# Patient Record
Sex: MALE | Race: ASIAN | NOT HISPANIC OR LATINO
[De-identification: names, ages, dates, MRNs, and addresses within clinical notes are randomized per-mention and may not be internally consistent; named-entity substitution may affect disease eponyms.]

---

## 2017-04-17 ENCOUNTER — RX RENEWAL (OUTPATIENT)
Age: 72
End: 2017-04-17

## 2018-02-02 ENCOUNTER — APPOINTMENT (OUTPATIENT)
Dept: UROLOGY | Facility: CLINIC | Age: 73
End: 2018-02-02
Payer: MEDICARE

## 2018-02-02 VITALS
HEART RATE: 63 BPM | OXYGEN SATURATION: 98 % | SYSTOLIC BLOOD PRESSURE: 137 MMHG | RESPIRATION RATE: 18 BRPM | TEMPERATURE: 97.4 F | WEIGHT: 134 LBS | BODY MASS INDEX: 20.37 KG/M2 | DIASTOLIC BLOOD PRESSURE: 76 MMHG

## 2018-02-02 PROCEDURE — 99214 OFFICE O/P EST MOD 30 MIN: CPT

## 2018-02-03 LAB
ANION GAP SERPL CALC-SCNC: 12 MMOL/L
BUN SERPL-MCNC: 15 MG/DL
CALCIUM SERPL-MCNC: 9.8 MG/DL
CHLORIDE SERPL-SCNC: 104 MMOL/L
CO2 SERPL-SCNC: 27 MMOL/L
CREAT SERPL-MCNC: 0.88 MG/DL
GLUCOSE SERPL-MCNC: 127 MG/DL
POTASSIUM SERPL-SCNC: 4.7 MMOL/L
PSA FREE FLD-MCNC: 26.4
PSA FREE SERPL-MCNC: 0.78 NG/ML
PSA SERPL-MCNC: 2.95 NG/ML
SODIUM SERPL-SCNC: 143 MMOL/L

## 2019-02-05 ENCOUNTER — APPOINTMENT (OUTPATIENT)
Dept: UROLOGY | Facility: CLINIC | Age: 74
End: 2019-02-05
Payer: MEDICARE

## 2019-02-05 VITALS
BODY MASS INDEX: 20.53 KG/M2 | OXYGEN SATURATION: 96 % | RESPIRATION RATE: 17 BRPM | TEMPERATURE: 97.9 F | WEIGHT: 135 LBS | SYSTOLIC BLOOD PRESSURE: 135 MMHG | HEART RATE: 67 BPM | DIASTOLIC BLOOD PRESSURE: 70 MMHG

## 2019-02-05 PROCEDURE — 99214 OFFICE O/P EST MOD 30 MIN: CPT

## 2019-02-05 NOTE — ADDENDUM
[FreeTextEntry1] : Entered by Loren Roth, acting as scribe for Dr. Vaibhav Mesa.\par \par The documentation recorded by the scribe accurately reflects the service I personally performed and the decisions made by me.

## 2019-02-05 NOTE — LETTER BODY
[FreeTextEntry1] : Norbert Garcia M.D.\par [address] [place] [address]\[place] [address]\par [phone] \par [phone]\par \par Dear Dr. Garcia,\par \par Reason for visit: Elevated PSA. BPH. \par \par This is a 73 year-old gentleman with BPH and elevated PSA status post previous negative prostate biopsy in 2013. His PSA in 2016 was 2.83. Patient returns for follow up. Since his previous visit, the patient continues to take Flomax BID and Proscar regularly with no side effects or difficulties. He reports of nocturia up to 3x per night. The patient previously experienced incomplete voiding. Patient denies any pain or discomfort. Patient denies any changes in health. He denies any hematuria or dysuria. The past medical history, family history and social history are unchanged. All other review of systems are negative. Patient denies any changes in medications. Medication list was reconciled.\par \par On examination, the patient is a elderly-appearing gentleman in no acute distress. He is alert and oriented follows commands. He has normal mood and affect. He is normocephalic. Oral no thrush. Neck is supple. Respirations are unlabored. His abdomen is soft and nontender. Liver is nonpalpable. Bladder is nonpalpable. No CVA tenderness. Neurologically he is grossly intact. No peripheral edema. Skin without gross abnormality. \par \par His PSA in 2016 was 2.95 on Proscar which is improved. His PSA was previously 8.43.\par \par Assessment: Elevated PSA, stable on Proscar. BPH. \par \par I counseled the patient. In terms of his BPH, the patient reports lower urinary tract symptoms. He declines surgical intervention. I renewed his prescription for Proscar and Flomax BID today. I encouraged the patient continue medication as directed. In terms of his elevated PSA, his recent PSA is stable. I recommend the patient repeat PSA today and obtain BMP to further evaluate. Risks and alternatives were discussed. I answered the patient's questions. The patient will follow up as directed and will contact me with any questions or concerns. Thank you for the opportunity to participate in the care of Mr. HALL. I will keep you updated on his progress. \par \par Plan: Repeat PSA. BMP. Continue Proscar and Flomax BID. Follow up in one year. \par

## 2019-02-06 LAB
ANION GAP SERPL CALC-SCNC: 11 MMOL/L
BUN SERPL-MCNC: 24 MG/DL
CALCIUM SERPL-MCNC: 9.6 MG/DL
CHLORIDE SERPL-SCNC: 104 MMOL/L
CO2 SERPL-SCNC: 26 MMOL/L
CREAT SERPL-MCNC: 0.92 MG/DL
GLUCOSE SERPL-MCNC: 108 MG/DL
POTASSIUM SERPL-SCNC: 4.5 MMOL/L
SODIUM SERPL-SCNC: 141 MMOL/L

## 2019-02-09 LAB
PSA FREE FLD-MCNC: 26 %
PSA FREE SERPL-MCNC: 0.7 NG/ML
PSA SERPL-MCNC: 2.75 NG/ML

## 2020-02-04 ENCOUNTER — APPOINTMENT (OUTPATIENT)
Dept: UROLOGY | Facility: CLINIC | Age: 75
End: 2020-02-04
Payer: MEDICARE

## 2020-02-04 VITALS
HEART RATE: 69 BPM | WEIGHT: 137 LBS | OXYGEN SATURATION: 96 % | TEMPERATURE: 97.3 F | BODY MASS INDEX: 20.83 KG/M2 | SYSTOLIC BLOOD PRESSURE: 156 MMHG | DIASTOLIC BLOOD PRESSURE: 77 MMHG | RESPIRATION RATE: 18 BRPM

## 2020-02-04 PROCEDURE — 99214 OFFICE O/P EST MOD 30 MIN: CPT

## 2020-02-04 NOTE — LETTER BODY
[FreeTextEntry1] : Norbert Garcia M.D.\par [address] [place] [address]\[place] [address]\par [phone] \par [phone]\par \par Dear Dr. Garcia,\par \par Reason for visit: Elevated PSA. BPH. \par \par This is a 74 year-old gentleman with BPH and elevated PSA status post previous negative prostate biopsy in 2013. Patient returns today for follow up. His most recent PSA was 2.75. Since his previous visit, the patient reports he is doing well. He continues to take Flomax BID and Proscar regularly with no side effects or difficulties. He reports of an improvement in his urinary symptoms with medication. However, he reports of occasional hesitancy, specifically difficulty starting a stream. He denies any hematuria or dysuria. Patient denies any pain or discomfort. Patient denies any changes in health. The past medical history, family history and social history are unchanged. All other review of systems are negative. Patient denies any changes in medications. Medication list was reconciled. He has no known allergies to medication.\par \par On examination, the patient is an elderly-appearing gentleman in no acute distress. He is alert and oriented follows commands. He has normal mood and affect. He is normocephalic. Oral no thrush. Neck is supple. Respirations are unlabored. His abdomen is soft and nontender. Liver is nonpalpable. Bladder is nonpalpable. No CVA tenderness. Neurologically he is grossly intact. No peripheral edema. Skin without gross abnormality. \par \par His most recent PSA in February 2019 on Proscar was 2.75, which is within normal limits. His PSA in 2016 was 2.95 on Proscar. His PSA was previously 8.43.\par \par Assessment: Elevated PSA, stable on Proscar. BPH, symptoms stable on Flomax BID.\par \par I counseled the patient. He is doing well. In terms of his elevated PSA, his PSA has normalized and stabilized on Proscar. In terms of his BPH, the patient reports of occasional hesitancy despite Flomax BID. However, he reports of minimal bother. I renewed the patient's prescription for Flomax BID and Proscar today. I encouraged the patient to continue medications regularly as directed. I recommend the patient repeat PSA today and obtain BMP to ensure stability. Patient understands that if he develops gross hematuria or any urinary discomfort, he will contact me for further evaluation. Risks and alternatives were discussed. I answered the patient's questions. The patient will follow up as directed and will contact me with any questions or concerns. Thank you for the opportunity to participate in the care of Mr. HALL. I will keep you updated on his progress. \par \par Plan: Continue Proscar and Flomax BID. Repeat PSA. BMP. Follow up in 1 year.

## 2020-02-04 NOTE — ADDENDUM
[FreeTextEntry1] : Entered by Abhay Dale, acting as scribe for Dr. Vaibhav Mesa.\par \par The documentation recorded by the scribe accurately reflects the service I personally performed and the decisions made by me.

## 2020-02-09 LAB
ANION GAP SERPL CALC-SCNC: 13 MMOL/L
BUN SERPL-MCNC: 18 MG/DL
CALCIUM SERPL-MCNC: 9.8 MG/DL
CHLORIDE SERPL-SCNC: 104 MMOL/L
CO2 SERPL-SCNC: 24 MMOL/L
CREAT SERPL-MCNC: 1.05 MG/DL
GLUCOSE SERPL-MCNC: 116 MG/DL
POTASSIUM SERPL-SCNC: 4.3 MMOL/L
PSA FREE FLD-MCNC: 23 %
PSA FREE SERPL-MCNC: 0.85 NG/ML
PSA SERPL-MCNC: 3.69 NG/ML
SODIUM SERPL-SCNC: 142 MMOL/L

## 2021-02-23 ENCOUNTER — APPOINTMENT (OUTPATIENT)
Dept: UROLOGY | Facility: CLINIC | Age: 76
End: 2021-02-23
Payer: MEDICARE

## 2021-02-23 PROCEDURE — 99214 OFFICE O/P EST MOD 30 MIN: CPT | Mod: 95

## 2021-02-23 NOTE — LETTER BODY
[FreeTextEntry1] : Norbert Garcia M.D.\par [address] [place] [address]\[place] [address]\par [phone] \par [phone]\par \par Dear Dr. Garcia,\par \par Reason for visit: Elevated PSA. BPH. \par \par This is a 75 year-old gentleman with BPH and elevated PSA status post previous negative prostate biopsy in 2013. Patient requested telehealth visit. Verbal consent was obtained. Patient was at home and consulted over TeleHealth visit. I was in the office in [place]. Since he was last seen, the patient reports that he continues to take Flomax BID and Proscar regularly without any side effects or difficulties with the medications. He reports of stable urinary symptoms on medical therapy. He denies any hematuria or dysuria. The patient is overall well. Patient denies any pain or discomfort. He denies any changes in health. The past medical history, family history and social history are unchanged. All other review of systems are negative. Patient denies any changes in medications. Medication list was reconciled. He has no known allergies to medication.\par \par Given that this was a Telehealth visit, I was unable to physically examine the patient, his physical examination was deferred.\par \par His BMP demonstrated normal renal functions, creatinine 1.05. His PSA was 3.69, which is within normal limits.\par \par Assessment: Elevated PSA, stable on Proscar. BPH, symptoms stable on Flomax BID.\par \par I counseled the patient. The patient is overall well.. In terms of his elevated PSA, I reassured the patient. His recent PSA on Proscar was 3.69, which is within normal limits. I recommended he continue taking Proscar. In terms of his BPH, his symptoms are stable on medical therapy. I recommended he continue taking Flomax BID. I renewed the patient's prescription for Flomax BID and Proscar today. I encouraged the patient to continue medications regularly as directed. Risks and alternatives were discussed. I answered the patient's questions. The patient will follow up in 6 months and will contact me with any questions or concerns. Thank you for the opportunity to participate in the care of Mr. HALL. I will keep you updated on his progress. \par \par Plan: Continue Flomax BID and Proscar. Follow-up in 6 months.

## 2021-02-23 NOTE — HISTORY OF PRESENT ILLNESS
[Home] : at home, [unfilled] , at the time of the visit. [Medical Office: ([facility])___] : at the medical office located in  [Verbal consent obtained from patient] : the patient, [unfilled] [FreeTextEntry1] : Please refer to URO Consult note\par \par The patient-doctor relationship has been established in a face to face fashion via real time video/audio HIPAA compliant communication using telemedicine software [employer].  The patient's identity has been confirmed.  The patient was previously emailed a copy of the telemedicine consent.  They have had a chance to review and has now given verbal consent and has requested care to be assessed and treated through telemedicine and understands there maybe limitations in this process and they may need further follow up care in the office and or hospital settings.

## 2021-02-23 NOTE — ADDENDUM
[FreeTextEntry1] : Entered by Eric Tapia, acting as scribe for Dr. Vaibhav Mesa.\par \par The documentation recorded by the scribe accurately reflects the service I personally performed and the decisions made by me.\par

## 2021-07-30 ENCOUNTER — APPOINTMENT (OUTPATIENT)
Dept: UROLOGY | Facility: CLINIC | Age: 76
End: 2021-07-30
Payer: MEDICARE

## 2021-07-30 VITALS
HEART RATE: 73 BPM | BODY MASS INDEX: 21.44 KG/M2 | SYSTOLIC BLOOD PRESSURE: 115 MMHG | TEMPERATURE: 97.6 F | OXYGEN SATURATION: 95 % | DIASTOLIC BLOOD PRESSURE: 72 MMHG | WEIGHT: 141 LBS | RESPIRATION RATE: 16 BRPM

## 2021-07-30 PROCEDURE — 99213 OFFICE O/P EST LOW 20 MIN: CPT

## 2021-07-30 NOTE — LETTER BODY
[FreeTextEntry1] : Norbert Garcia M.D.\par [address] [place] [address]\[place] [address]\par [phone] \par [phone]\par \par Dear Dr. Garcia,\par \par Reason for visit: Elevated PSA. BPH. \par \par This is a 75 year-old gentleman with BPH and elevated PSA status post previous negative prostate biopsy in 2013. Patient returns today for follow up. Since he was last seen, the patient reports that he continues to take Flomax BID and Proscar regularly without any side effects or difficulties with the medications. He reports of stable urinary symptoms on medical therapy. He denies any hematuria or dysuria. The patient is overall well. Patient denies any pain or discomfort. He denies any changes in health. The past medical history, family history and social history are unchanged. All other review of systems are negative. Patient denies any changes in medications. Medication list was reconciled. He has no known allergies to medication.\par \par On examination, the patient is a healthy-appearing gentleman in no acute distress. He is alert and oriented follows commands. He has normal mood and affect. He is normocephalic. Neck is supple. Oral no thrush Respirations are unlabored. His abdomen is soft and nontender. Bladder is nonpalpable. No CVA tenderness. Neurologically he is grossly intact. No peripheral edema. Skin without gross abnormality. He has normal male external genitalia. Normal meatus. Bilateral testes are descended intrascrotally and normal to palpation. On rectal examination, there is normal sphincter tone. The prostate is clinically benign without focal induration or nodularity.\par \par His BMP from February 2020 demonstrated normal renal functions, creatinine 1.05. His PSA was 3.69, which is within normal limits.\par \par Assessment: Elevated PSA, stable on Proscar. BPH, symptoms stable on Flomax BID.\par \par I counseled the patient. The patient is doing well.  In terms of his elevated PSA, I reassured the patient. His recent PSA on Proscar was 3.69, which is within normal limits. I recommended he continue taking Proscar. In terms of his BPH, his symptoms are stable on medical therapy. I recommended he continue taking Flomax BID. I renewed the patient's prescription for Flomax BID and Proscar today. I encouraged the patient to continue medications regularly as directed. Risks and alternatives were discussed. I answered the patient's questions. The patient will follow up in 1 year  and will contact me with any questions or concerns. Thank you for the opportunity to participate in the care of Mr. HALL. I will keep you updated on his progress. \par \par Plan: Continue Flomax BID and Proscar. Follow-up in 1 year.

## 2021-07-30 NOTE — HISTORY OF PRESENT ILLNESS
[FreeTextEntry1] : Please refer to URO Consult note \par \par FUA BPH FLOMAX PROSCAR\par \par STABLE\par \par Followup in 1 year

## 2021-07-30 NOTE — ADDENDUM
[FreeTextEntry1] : Entered by Miguel Alston, acting as scribe for Dr. Vaibhav Mesa.\par \par The documentation recorded by the scribe accurately reflects the service I personally performed and the decisions made by me.

## 2022-08-26 ENCOUNTER — APPOINTMENT (OUTPATIENT)
Dept: UROLOGY | Facility: CLINIC | Age: 77
End: 2022-08-26

## 2022-08-26 VITALS
TEMPERATURE: 97.7 F | DIASTOLIC BLOOD PRESSURE: 72 MMHG | RESPIRATION RATE: 16 BRPM | HEART RATE: 65 BPM | SYSTOLIC BLOOD PRESSURE: 161 MMHG | WEIGHT: 153 LBS | BODY MASS INDEX: 23.26 KG/M2 | OXYGEN SATURATION: 96 %

## 2022-08-26 DIAGNOSIS — Z00.00 ENCOUNTER FOR GENERAL ADULT MEDICAL EXAMINATION W/OUT ABNORMAL FINDINGS: ICD-10-CM

## 2022-08-26 DIAGNOSIS — N40.1 BENIGN PROSTATIC HYPERPLASIA WITH LOWER URINARY TRACT SYMPMS: ICD-10-CM

## 2022-08-26 DIAGNOSIS — R97.20 ELEVATED PROSTATE, SPECIFIC ANTIGEN [PSA]: ICD-10-CM

## 2022-08-26 DIAGNOSIS — N13.8 BENIGN PROSTATIC HYPERPLASIA WITH LOWER URINARY TRACT SYMPMS: ICD-10-CM

## 2022-08-26 PROCEDURE — 99214 OFFICE O/P EST MOD 30 MIN: CPT

## 2022-08-26 NOTE — LETTER BODY
[FreeTextEntry1] : Norbert Garcia M.D.\par [address] [place] [address]\[place] [address]\par [phone] \par [phone]\par \par Dear Dr. Garcia,\par \par Reason for visit: Elevated PSA. BPH. \par \par This is a 76 year-old gentleman with BPH and elevated PSA status post previous negative prostate biopsy in 2013. The patient was last seen by me 2 years ago. Patient returns today for follow up. Since he was last seen, the patient reports that he continues to take Flomax BID and Proscar regularly without any side effects or difficulties with the medications. He reports of stable urinary symptoms on medical therapy. He denies any hematuria or dysuria. The patient is overall well. Patient denies any pain or discomfort. He denies any changes in health. The past medical history, family history and social history are unchanged. All other review of systems are negative. Patient denies any changes in medications. Medication list was reconciled. He has no known allergies to medication.\par \par On examination, the patient is a healthy-appearing gentleman in no acute distress. He is alert and oriented follows commands. He has normal mood and affect. He is normocephalic. Neck is supple. Oral no thrush Respirations are unlabored. His abdomen is soft and nontender. Bladder is nonpalpable. No CVA tenderness. Neurologically he is grossly intact. No peripheral edema. Skin without gross abnormality. He has normal male external genitalia. Normal meatus. Bilateral testes are descended intrascrotally and normal to palpation. On rectal examination, there is normal sphincter tone. The prostate is clinically benign without focal induration or nodularity.\par \par His BMP from February 2020 demonstrated normal renal functions, creatinine 1.05. His PSA was 3.69, which is within normal limits.\par \par Assessment: Elevated PSA, stable on Proscar. BPH, symptoms stable on Flomax BID.\par \par I counseled the patient. The patient is doing well. In terms of his elevated PSA, I reassured the patient. His PSA 2 years ago on Proscar was 3.69, which is within normal limits. I recommended he obtain PSA and BMP to ensure stability  I recommended he continue taking Proscar. In terms of his BPH, his symptoms are stable on medical therapy. I recommended he continue taking Flomax BID. I renewed the patient's prescription for Flomax BID and Proscar today. I encouraged the patient to continue medications regularly as directed. Risks and alternatives were discussed. I answered the patient's questions. The patient will follow up in 1 year and will contact me with any questions or concerns. Thank you for the opportunity to participate in the care of Mr. HALL. I will keep you updated on his progress. \par \par Plan: Continue Flomax BID and Proscar. PSA. BMP. Follow-up in 1 year.

## 2022-08-26 NOTE — ADDENDUM
[FreeTextEntry1] : Entered by JAQUAN ROA, acting as scribe for Dr. Vaibhav Mesa.\par The documentation recorded by the scribe accurately reflects the service I personally performed and the decisions made by me.

## 2022-08-28 LAB
ANION GAP SERPL CALC-SCNC: 16 MMOL/L
BUN SERPL-MCNC: 22 MG/DL
CALCIUM SERPL-MCNC: 9.8 MG/DL
CHLORIDE SERPL-SCNC: 106 MMOL/L
CO2 SERPL-SCNC: 20 MMOL/L
CREAT SERPL-MCNC: 1.03 MG/DL
EGFR: 74 ML/MIN/1.73M2
GLUCOSE SERPL-MCNC: 139 MG/DL
POTASSIUM SERPL-SCNC: 3.6 MMOL/L
PSA FREE FLD-MCNC: 22 %
PSA FREE SERPL-MCNC: 0.8 NG/ML
PSA SERPL-MCNC: 3.59 NG/ML
SODIUM SERPL-SCNC: 142 MMOL/L

## 2022-08-30 ENCOUNTER — NON-APPOINTMENT (OUTPATIENT)
Age: 77
End: 2022-08-30

## 2023-09-08 ENCOUNTER — APPOINTMENT (OUTPATIENT)
Dept: UROLOGY | Facility: CLINIC | Age: 78
End: 2023-09-08
Payer: MEDICARE

## 2023-09-08 VITALS
BODY MASS INDEX: 22.81 KG/M2 | OXYGEN SATURATION: 95 % | SYSTOLIC BLOOD PRESSURE: 133 MMHG | DIASTOLIC BLOOD PRESSURE: 68 MMHG | TEMPERATURE: 98 F | WEIGHT: 150 LBS | HEART RATE: 77 BPM | RESPIRATION RATE: 16 BRPM

## 2023-09-08 DIAGNOSIS — K40.90 UNILATERAL INGUINAL HERNIA, W/OUT OBSTRUCTION OR GANGRENE, NOT SPECIFIED AS RECURRENT: ICD-10-CM

## 2023-09-08 PROCEDURE — 99214 OFFICE O/P EST MOD 30 MIN: CPT

## 2023-09-08 PROCEDURE — 51798 US URINE CAPACITY MEASURE: CPT

## 2023-09-09 LAB
ANION GAP SERPL CALC-SCNC: 13 MMOL/L
APPEARANCE: CLEAR
BACTERIA: NEGATIVE /HPF
BILIRUBIN URINE: NEGATIVE
BLOOD URINE: NEGATIVE
BUN SERPL-MCNC: 27 MG/DL
CALCIUM OXALATE CRYSTALS: PRESENT
CALCIUM SERPL-MCNC: 9.3 MG/DL
CAST: 0 /LPF
CHLORIDE SERPL-SCNC: 108 MMOL/L
CO2 SERPL-SCNC: 23 MMOL/L
COLOR: YELLOW
CREAT SERPL-MCNC: 1.17 MG/DL
EGFR: 63 ML/MIN/1.73M2
EPITHELIAL CELLS: 0 /HPF
GLUCOSE QUALITATIVE U: NEGATIVE MG/DL
GLUCOSE SERPL-MCNC: 181 MG/DL
KETONES URINE: NEGATIVE MG/DL
LEUKOCYTE ESTERASE URINE: NEGATIVE
MICROSCOPIC-UA: NORMAL
NITRITE URINE: NEGATIVE
PH URINE: 5.5
POTASSIUM SERPL-SCNC: 4.3 MMOL/L
PROTEIN URINE: NEGATIVE MG/DL
PSA FREE FLD-MCNC: 16 %
PSA FREE SERPL-MCNC: 0.56 NG/ML
PSA SERPL-MCNC: 3.51 NG/ML
RED BLOOD CELLS URINE: NORMAL /HPF
REVIEW: NORMAL
SODIUM SERPL-SCNC: 144 MMOL/L
SPECIFIC GRAVITY URINE: 1.02
UROBILINOGEN URINE: 0.2 MG/DL
WHITE BLOOD CELLS URINE: 1 /HPF

## 2023-09-09 NOTE — LETTER BODY
[FreeTextEntry1] : Norbert Garcia M.D. [address] [place] [address] [phone]   Dear Dr. Garcia,   Reason for visit: Elevated PSA. BPH.   This is a 77 year-old gentleman with BPH and elevated PSA status post previous negative prostate biopsy in 2013.  Patient returns today for follow up. Since he was last seen, the patient reports that he continues to take Flomax BID and Proscar regularly without any side effects or difficulties with the medications. He reports of stable urinary symptoms on medical therapy. He denies any hematuria or dysuria. The patient is overall well. Patient denies any pain or discomfort. He denies any changes in health. The past medical history, family history and social history are unchanged. All other review of systems are negative. Patient denies any changes in medications. Medication list was reconciled. He has no known allergies to medication.    On examination, the patient is a healthy-appearing gentleman in no acute distress. He is alert and oriented follows commands. He has normal mood and affect. He is normocephalic. Neck is supple. Oral no thrush Respirations are unlabored. His abdomen is soft and nontender. Bladder is nonpalpable. No CVA tenderness. Neurologically he is grossly intact. No peripheral edema. Skin without gross abnormality. He has normal male external genitalia. Normal meatus. Bilateral testes are descended intrascrotally and normal to palpation. On rectal examination, there is normal sphincter tone. The prostate is clinically benign without focal induration or nodularity.    His BMP from August 2022 demonstrated normal renal functions, creatinine 1.03. His PSA was 3.59, which is within normal limits.    Assessment: Elevated PSA, stable on Proscar. BPH, symptoms stable on Flomax BID.    I counseled the patient. The patient is doing well. In terms of his elevated PSA, his PSA was within normal limits. I recommended he obtain PSA and BMP to ensure stability. In terms of his BPH, his symptoms are stable on medical therapy. I recommended he continue taking Flomax BID and Proscar. I renewed the patient's prescription for Flomax BID and Proscar today. I encouraged the patient to continue medications regularly as directed. Risks and alternatives were discussed. I answered the patient's questions. The patient will follow up in 1 year and will contact me with any questions or concerns. Thank you for the opportunity to participate in the care of Mr. HALL. I will keep you updated on his progress.    Plan: Continue Flomax BID and Proscar. PSA. BMP. Follow-up in 1 year.

## 2023-09-09 NOTE — ADDENDUM
[FreeTextEntry1] : Entered by Najma Asencio, acting as scribe for Dr. Vaibhav Mesa. The documentation recorded by the scribe accurately reflects the service I personally performed and the decisions made by me.

## 2023-09-10 LAB — BACTERIA UR CULT: NORMAL

## 2023-09-17 ENCOUNTER — NON-APPOINTMENT (OUTPATIENT)
Age: 78
End: 2023-09-17

## 2024-09-10 ENCOUNTER — APPOINTMENT (OUTPATIENT)
Dept: UROLOGY | Facility: CLINIC | Age: 79
End: 2024-09-10
Payer: MEDICARE

## 2024-09-10 VITALS
OXYGEN SATURATION: 96 % | HEART RATE: 59 BPM | TEMPERATURE: 97.5 F | SYSTOLIC BLOOD PRESSURE: 130 MMHG | BODY MASS INDEX: 22.2 KG/M2 | RESPIRATION RATE: 16 BRPM | WEIGHT: 146 LBS | DIASTOLIC BLOOD PRESSURE: 66 MMHG

## 2024-09-10 DIAGNOSIS — R97.20 ELEVATED PROSTATE, SPECIFIC ANTIGEN [PSA]: ICD-10-CM

## 2024-09-10 DIAGNOSIS — R39.15 URGENCY OF URINATION: ICD-10-CM

## 2024-09-10 DIAGNOSIS — N40.1 BENIGN PROSTATIC HYPERPLASIA WITH LOWER URINARY TRACT SYMPMS: ICD-10-CM

## 2024-09-10 DIAGNOSIS — N13.8 BENIGN PROSTATIC HYPERPLASIA WITH LOWER URINARY TRACT SYMPMS: ICD-10-CM

## 2024-09-10 PROCEDURE — G2211 COMPLEX E/M VISIT ADD ON: CPT

## 2024-09-10 PROCEDURE — 99214 OFFICE O/P EST MOD 30 MIN: CPT

## 2024-09-10 NOTE — LETTER BODY
[FreeTextEntry1] : Abhay Frey [address], [place] [address] [phone]  Dear Dr. Frey,  Reason for visit: Elevated PSA. BPH.   This is a 78-year-old gentleman with BPH and elevated PSA status post previous negative prostate biopsy in 2013. Patient returns today for follow up. Since he was last seen, the patient reports that he continues to take Flomax BID and Proscar regularly without any side effects or difficulties with the medications. He reports of stable urinary symptoms on medical therapy. He denies any hematuria or dysuria. The patient is overall well. Patient denies any pain or discomfort. He denies any changes in health. The past medical history, family history and social history are unchanged. All other review of systems are negative. Patient denies any changes in medications. Medication list was reconciled. He has no known allergies to medication.    On examination, the patient is a healthy-appearing gentleman in no acute distress. He is alert and oriented follows commands. He has normal mood and affect. He is normocephalic. Neck is supple. Oral no thrush Respirations are unlabored. His abdomen is soft and nontender. Bladder is nonpalpable. No CVA tenderness. Neurologically he is grossly intact. No peripheral edema. Skin without gross abnormality. He has normal male external genitalia. Normal meatus. Bilateral testes are descended intrascrotally and normal to palpation. On rectal examination, there is normal sphincter tone. The prostate is clinically benign without focal induration or nodularity.    His BMP from September 2023 demonstrated normal renal functions, creatinine 1.17. His PSA was 3.51, which is within normal limits.    Assessment: Elevated PSA, stable on Proscar. BPH, symptoms stable on Flomax BID.    I counseled the patient. The patient is doing well. In terms of his elevated PSA, his PSA was within normal limits. I recommended he obtain PSA and BMP to ensure stability. In terms of his BPH, his symptoms are stable on medical therapy. I recommended he continue taking Flomax BID and Proscar. I renewed the patient's prescription for Flomax BID and Proscar today. I encouraged the patient to continue medications regularly as directed. Risks and alternatives were discussed. I answered the patient's questions. The patient will follow up in 1 year and will contact me with any questions or concerns. Thank you for the opportunity to participate in the care of Mr. HALL. I will keep you updated on his progress.    Plan: Continue Flomax BID and Proscar. PSA. BMP. Follow-up in 1 year.  I spent 30-minutes time today on all issues related to this patient on today date of service including non face to face time.

## 2024-09-10 NOTE — ADDENDUM
[FreeTextEntry1] : Entered by Curry Linton, acting as scribe for Dr. Vaibhav Mesa. The documentation recorded by the scribe accurately reflects the service I personally performed and the decisions made by me.

## 2024-09-10 NOTE — HISTORY OF PRESENT ILLNESS
[FreeTextEntry1] : Follow-up for BPH, on Flomax and Proscar urinary urgency for about a month, denied dysuria and hematuria Follow-up for elevated PSA, negative biopsy in 2013, PSA 3.51 in Sept 2023  Please refer to URO Consult Note.

## 2024-09-11 LAB
ANION GAP SERPL CALC-SCNC: 12 MMOL/L
BUN SERPL-MCNC: 18 MG/DL
CALCIUM SERPL-MCNC: 9.6 MG/DL
CHLORIDE SERPL-SCNC: 108 MMOL/L
CO2 SERPL-SCNC: 22 MMOL/L
CREAT SERPL-MCNC: 1 MG/DL
EGFR: 76 ML/MIN/1.73M2
GLUCOSE SERPL-MCNC: 140 MG/DL
POTASSIUM SERPL-SCNC: 4.4 MMOL/L
PSA FREE FLD-MCNC: 25 %
PSA FREE SERPL-MCNC: 0.94 NG/ML
PSA SERPL-MCNC: 3.81 NG/ML
SODIUM SERPL-SCNC: 142 MMOL/L

## 2025-04-14 ENCOUNTER — OUTPATIENT (OUTPATIENT)
Dept: OUTPATIENT SERVICES | Facility: HOSPITAL | Age: 80
LOS: 1 days | End: 2025-04-14
Payer: MEDICARE

## 2025-04-14 VITALS
RESPIRATION RATE: 17 BRPM | HEIGHT: 65 IN | SYSTOLIC BLOOD PRESSURE: 148 MMHG | TEMPERATURE: 98 F | DIASTOLIC BLOOD PRESSURE: 78 MMHG | WEIGHT: 145.06 LBS | OXYGEN SATURATION: 96 % | HEART RATE: 69 BPM

## 2025-04-14 DIAGNOSIS — M17.0 BILATERAL PRIMARY OSTEOARTHRITIS OF KNEE: ICD-10-CM

## 2025-04-14 DIAGNOSIS — Z01.818 ENCOUNTER FOR OTHER PREPROCEDURAL EXAMINATION: ICD-10-CM

## 2025-04-14 DIAGNOSIS — Z91.89 OTHER SPECIFIED PERSONAL RISK FACTORS, NOT ELSEWHERE CLASSIFIED: ICD-10-CM

## 2025-04-14 DIAGNOSIS — I10 ESSENTIAL (PRIMARY) HYPERTENSION: ICD-10-CM

## 2025-04-14 DIAGNOSIS — N40.0 BENIGN PROSTATIC HYPERPLASIA WITHOUT LOWER URINARY TRACT SYMPTOMS: ICD-10-CM

## 2025-04-14 DIAGNOSIS — E11.9 TYPE 2 DIABETES MELLITUS WITHOUT COMPLICATIONS: ICD-10-CM

## 2025-04-14 LAB — BLD GP AB SCN SERPL QL: SIGNIFICANT CHANGE UP

## 2025-04-14 RX ORDER — TRAMADOL HYDROCHLORIDE 50 MG/1
50 TABLET, FILM COATED ORAL ONCE
Refills: 0 | Status: DISCONTINUED | OUTPATIENT
Start: 2025-04-21 | End: 2025-04-21

## 2025-04-14 RX ORDER — FINASTERIDE 1 MG/1
1 TABLET, FILM COATED ORAL
Refills: 0 | DISCHARGE

## 2025-04-14 RX ORDER — ACETAMINOPHEN 500 MG/5ML
975 LIQUID (ML) ORAL ONCE
Refills: 0 | Status: COMPLETED | OUTPATIENT
Start: 2025-04-21 | End: 2025-04-21

## 2025-04-14 NOTE — H&P PST ADULT - BSA (M2)
From: Leonora Norman  To: Tam Trevino MD  Sent: 4/5/2018 1:35 PM CDT  Subject: Suppositories    Hi Dr Trevino!  Dr Dupree said that I could stop all of my injections as well as the progesterone suppositories when I'm 11 weeks (which is tomorrow). I just wanted to confirm that you were also ok w/ that as I thought maybe w/ my other 2 pregnancies you had me doing the suppositories until I was in my 2nd trimester. Are you good w/ my stopping tomorrow?  Leonora   1.73

## 2025-04-14 NOTE — H&P PST ADULT - PROBLEM SELECTOR PLAN 6
Caprini Total Score - 9    Caprini Score Greater than or =7: High risk, pharmocologic VTE prophylaxis indicated for most patients (in the absence of contraindications)

## 2025-04-14 NOTE — H&P PST ADULT - PROBLEM SELECTOR PLAN 1
Patient scheduled for Right Total Knee Replacement on 4/21/2025  Written and oral preoperative instructions given to patient with understanding verbalized.   Instructions given to include using 4% chlorhexidine wash as directed starting 3days before day of surgery and inclusive of day of surgery.   Maintaining NPO status post midnight day before surgery  Stopping aspirin, NSAIDs, herbs, vitamins 7days before surgery   Patient is to expect a phone call day before surgery between the hours of 430- 630pm giving arrival time for surgery day    Type/Screen drawn today, results pending   MRSA swab done today, result pending   Patient seen by Nehemias (PT). Discussed post-op rehab plans

## 2025-04-14 NOTE — H&P PST ADULT - ATTENDING COMMENTS
Plan: TKA right knee.  Risks: infection, nerve injury, blood clot, hardware failure, etc...  He signed the consent with his wife and daughter at the bedside.

## 2025-04-14 NOTE — H&P PST ADULT - NSICDXPASTMEDICALHX_GEN_ALL_CORE_FT
PAST MEDICAL HISTORY:  Enlarged prostate     History of constipation     Hyperlipidemia     Hypertension     Vitamin D deficiency

## 2025-04-14 NOTE — H&P PST ADULT - PROBLEM SELECTOR PLAN 3
Advised patient to continue regimen of Losartan 25mg daily  Patient advised with understanding verbalized to take Losartan with a sip of water the morning of surgery.   Follow up with PCP/Cardiologist postoperatively

## 2025-04-14 NOTE — H&P PST ADULT - HISTORY OF PRESENT ILLNESS
79year old male with pmhx of hypertension, hyperlipidemia, enlarged prostate, early onset of dementia, constipation and vitamin d deficiency presents with c/o right knee pain that has failed to resolve with conservative management. Patient is here today for presurgical testing for scheduled Right Total Knee Replacement on 4/21/2025

## 2025-04-14 NOTE — CHART NOTE - NSCHARTNOTEFT_GEN_A_CORE
PRE-TJR SOCIAL/FUNCTIONAL SCREENING Via:     In Person Meet  on 4/14/2025:  Preferred Language:  Mandarin but patient's daughter present to translate  Patient Telephone #: [phone] (dtr)  EMAIL ADDRESS: [email or web address]  Insurance:  [employer]  Pt stated Goal for Surgery : Go Dancing  SURGERY DETAILS:  Sx Date:  4/21/2025                                                                                           Surgery Type:  Right Knee Replacement  Surgeon:  Dr. Arrington   RAPT Tool:  8/12 additional intervention to discharge directly home  Attitude towards SDD: fair    SOCIAL HISTORY:  Live With:  Spouse in a private house    Stairs Required to Access (Street to Front Door) Residence:    Yes; 12   Railing: Right and Left    Once Inside Pt Residence:   To Access a Bathroom:      No Stairs Required     To Access a Bedroom Where Pt. Can Sleep:  No Stairs Required      Pt. Currently Has Formal Home Health Services:   Yes ;        Telephone Number: [phone]  15hours per week     MOBILITY HISTORY:   Baseline Ambulation- Pt is Independent in ambulation  with assistive device:  Yes;   Cane   Pt. Owns Any Other Medical Equipment?  No, patient will need rolling walker and 3-in-1 commode     MEDICAL HISTORY:  Pt has any History of Back Surgery:   No   Pt has any Allergies:  No    Patient denies diagnosis of sleep apnea or use of CPAP    Pt. Pharmacy Information:  Name:  [facility]                  Address:   [address] [place] [address]       Telephone #: [phone]    Pt. will Require Transportation to Home Upon Discharge: No, family will drive patient home:    For Post-Acute Care:  Pt. prefers [facility] for post-acute needs   Pt electronically sent pre- op education book "What to do before and after knee replacement".  All details of upcoming procedure discussed including, precautions, the throughput process, post-op process including transportation, home-care and follow-up as well as important information regarding blood clots, pneumonia, falls, infection and pain.  Patient provided with stretching strap for HEP.    All questions answered and pt. verbalized understanding.  Pt. has my phone number for follow up as needed.

## 2025-04-14 NOTE — H&P PST ADULT - SKIN
Patient comes to clinic for BP check. Has taken medications and brings home diary to this visit.  AUTOMATED: 137/81 R   MANUAL: 132/76 L  
warm and dry/color normal/normal/no rashes/no ulcers

## 2025-04-14 NOTE — H&P PST ADULT - FUNCTIONAL STATUS
Able to walk up 1-2 FOS slowly w/o MEIER but with knee pain/4-10 METS
Xray Chest 1 View- PORTABLE-Urgent

## 2025-04-14 NOTE — H&P PST ADULT - PROBLEM SELECTOR PLAN 2
Current treatment regimen for diabetes - Metformin 500mg daily.   Patient instructed with understanding verbalized to HOLD Metformin the day of surgery.   Last Hgb A1C - 6.5 drawn 3/3/2025 by PCP   Fingerstick STAT AM day of surgery ordered   Follow up with PCP/Endocrinologist postoperatively

## 2025-04-14 NOTE — H&P PST ADULT - PROBLEM SELECTOR PLAN 5
Patient today with STOP bang score of 3, Intermediate risk for ZEUS   Patient denies current hx/dx of ZEUS/Sleep Study Test   Recommend maintaining perioperative ZEUS risk precautions.

## 2025-04-14 NOTE — H&P PST ADULT - ASSESSMENT
79year old male with bilateral primary osteoarthritis of knee      CAPRINI SCORE    AGE RELATED RISK FACTORS                                                             [ ] Age 41-60 years                                            (1 Point)  [ ] Age: 61-74 years                                           (2 Points)                 [x] Age= 75 years                                                (3 Points)             DISEASE RELATED RISK FACTORS                                                       [ ] Edema in the lower extremities                 (1 Point)                     [ ] Varicose veins                                               (1 Point)                                 [x] BMI > 25 Kg/m2                                            (1 Point)                                  [ ] Serious infection (ie PNA)                            (1 Point)                     [ ] Lung disease ( COPD, Emphysema)            (1 Point)                                                                          [ ] Acute myocardial infarction                         (1 Point)                  [ ] Congestive heart failure (in the previous month)  (1 Point)         [ ] Inflammatory bowel disease                            (1 Point)                  [ ] Central venous access, PICC or Port               (2 points)       (within the last month)                                                                [ ] Stroke (in the previous month)                        (5 Points)    [ ] Previous or present malignancy                       (2 points)                                                                                                                                                         HEMATOLOGY RELATED FACTORS                                                         [ ] Prior episodes of VTE                                     (3 Points)                     [ ] Positive family history for VTE                      (3 Points)                  [ ] Prothrombin 57760 A                                     (3 Points)                     [ ] Factor V Leiden                                                (3 Points)                        [ ] Lupus anticoagulants                                      (3 Points)                                                           [ ] Anticardiolipin antibodies                              (3 Points)                                                       [ ] High homocysteine in the blood                   (3 Points)                                             [ ] Other congenital or acquired thrombophilia      (3 Points)                                                [ ] Heparin induced thrombocytopenia                  (3 Points)                                        MOBILITY RELATED FACTORS  [ ] Bed rest                                                         (1 Point)  [ ] Plaster cast                                                    (2 points)  [ ] Bed bound for more than 72 hours           (2 Points)    GENDER SPECIFIC FACTORS  [ ] Pregnancy or had a baby within the last month   (1 Point)  [ ] Post-partum < 6 weeks                                   (1 Point)  [ ] Hormonal therapy  or oral contraception   (1 Point)  [ ] History of pregnancy complications              (1 point)  [ ] Unexplained or recurrent              (1 Point)    OTHER RISK FACTORS                                           (1 Point)  [ ] BMI >40, smoking, diabetes requiring insulin, chemotherapy  blood transfusions and length of surgery over 2 hours    SURGERY RELATED RISK FACTORS  [ ]  Section within the last month     (1 Point)  [ ] Minor surgery                                                  (1 Point)  [ ] Arthroscopic surgery                                       (2 Points)  [ ] Planned major surgery lasting more            (2 Points)      than 45 minutes     [x] Elective hip or knee joint replacement       (5 points)       surgery                                                TRAUMA RELATED RISK FACTORS  [ ] Fracture of the hip, pelvis, or leg                       (5 Points)  [ ] Spinal cord injury resulting in paralysis             (5 points)       (in the previous month)    [ ] Paralysis  (less than 1 month)                             (5 Points)  [ ] Multiple Trauma within 1 month                        (5 Points)    Total Score [9]

## 2025-04-14 NOTE — H&P PST ADULT - MUSCULOSKELETAL
details… Right knee/decreased ROM due to pain/strength 5/5 bilateral upper extremities/abnormal gait

## 2025-04-14 NOTE — H&P PST ADULT - PROBLEM SELECTOR PLAN 4
Advised patient to continue regimen of Tamsulosin 0.4mg daily   Patient advised with understanding verbalized to take Tamsulosin with a sip of water the morning of surgery.   Follow up with PCP/Urologist postoperatively

## 2025-04-15 LAB
MRSA PCR RESULT.: SIGNIFICANT CHANGE UP
S AUREUS DNA NOSE QL NAA+PROBE: SIGNIFICANT CHANGE UP

## 2025-04-15 PROCEDURE — 86901 BLOOD TYPING SEROLOGIC RH(D): CPT

## 2025-04-15 PROCEDURE — 87641 MR-STAPH DNA AMP PROBE: CPT

## 2025-04-15 PROCEDURE — T1013: CPT

## 2025-04-15 PROCEDURE — 86850 RBC ANTIBODY SCREEN: CPT

## 2025-04-15 PROCEDURE — 87640 STAPH A DNA AMP PROBE: CPT

## 2025-04-15 PROCEDURE — 36415 COLL VENOUS BLD VENIPUNCTURE: CPT

## 2025-04-15 PROCEDURE — G0463: CPT

## 2025-04-15 PROCEDURE — 86900 BLOOD TYPING SEROLOGIC ABO: CPT

## 2025-04-17 RX ORDER — CELECOXIB 50 MG/1
200 CAPSULE ORAL ONCE
Refills: 0 | Status: COMPLETED | OUTPATIENT
Start: 2025-04-21 | End: 2025-04-21

## 2025-04-21 ENCOUNTER — TRANSCRIPTION ENCOUNTER (OUTPATIENT)
Age: 80
End: 2025-04-21

## 2025-04-21 ENCOUNTER — INPATIENT (INPATIENT)
Facility: HOSPITAL | Age: 80
LOS: 0 days | Discharge: HOME HEALTH | DRG: 554 | End: 2025-04-22
Attending: ORTHOPAEDIC SURGERY | Admitting: ORTHOPAEDIC SURGERY
Payer: MEDICARE

## 2025-04-21 VITALS
TEMPERATURE: 98 F | OXYGEN SATURATION: 96 % | SYSTOLIC BLOOD PRESSURE: 101 MMHG | HEIGHT: 65 IN | RESPIRATION RATE: 16 BRPM | HEART RATE: 62 BPM | WEIGHT: 145.06 LBS | DIASTOLIC BLOOD PRESSURE: 58 MMHG

## 2025-04-21 DIAGNOSIS — E55.9 VITAMIN D DEFICIENCY, UNSPECIFIED: ICD-10-CM

## 2025-04-21 DIAGNOSIS — M17.11 UNILATERAL PRIMARY OSTEOARTHRITIS, RIGHT KNEE: ICD-10-CM

## 2025-04-21 DIAGNOSIS — N40.0 BENIGN PROSTATIC HYPERPLASIA WITHOUT LOWER URINARY TRACT SYMPTOMS: ICD-10-CM

## 2025-04-21 DIAGNOSIS — M17.0 BILATERAL PRIMARY OSTEOARTHRITIS OF KNEE: ICD-10-CM

## 2025-04-21 DIAGNOSIS — Z96.651 PRESENCE OF RIGHT ARTIFICIAL KNEE JOINT: ICD-10-CM

## 2025-04-21 DIAGNOSIS — I10 ESSENTIAL (PRIMARY) HYPERTENSION: ICD-10-CM

## 2025-04-21 DIAGNOSIS — G89.18 OTHER ACUTE POSTPROCEDURAL PAIN: ICD-10-CM

## 2025-04-21 DIAGNOSIS — E78.5 HYPERLIPIDEMIA, UNSPECIFIED: ICD-10-CM

## 2025-04-21 LAB
ANION GAP SERPL CALC-SCNC: 7 MMOL/L — SIGNIFICANT CHANGE UP (ref 5–17)
BLD GP AB SCN SERPL QL: SIGNIFICANT CHANGE UP
BUN SERPL-MCNC: 15 MG/DL — SIGNIFICANT CHANGE UP (ref 7–18)
CALCIUM SERPL-MCNC: 8.3 MG/DL — LOW (ref 8.4–10.5)
CHLORIDE SERPL-SCNC: 112 MMOL/L — HIGH (ref 96–108)
CO2 SERPL-SCNC: 22 MMOL/L — SIGNIFICANT CHANGE UP (ref 22–31)
CREAT SERPL-MCNC: 1.02 MG/DL — SIGNIFICANT CHANGE UP (ref 0.5–1.3)
EGFR: 74 ML/MIN/1.73M2 — SIGNIFICANT CHANGE UP
EGFR: 74 ML/MIN/1.73M2 — SIGNIFICANT CHANGE UP
GLUCOSE BLDC GLUCOMTR-MCNC: 108 MG/DL — HIGH (ref 70–99)
GLUCOSE BLDC GLUCOMTR-MCNC: 113 MG/DL — HIGH (ref 70–99)
GLUCOSE BLDC GLUCOMTR-MCNC: 198 MG/DL — HIGH (ref 70–99)
GLUCOSE BLDC GLUCOMTR-MCNC: 236 MG/DL — HIGH (ref 70–99)
GLUCOSE SERPL-MCNC: 113 MG/DL — HIGH (ref 70–99)
HCT VFR BLD CALC: 31.8 % — LOW (ref 39–50)
HGB BLD-MCNC: 11.2 G/DL — LOW (ref 13–17)
MCHC RBC-ENTMCNC: 32.7 PG — SIGNIFICANT CHANGE UP (ref 27–34)
MCHC RBC-ENTMCNC: 35.2 G/DL — SIGNIFICANT CHANGE UP (ref 32–36)
MCV RBC AUTO: 92.7 FL — SIGNIFICANT CHANGE UP (ref 80–100)
NRBC BLD AUTO-RTO: 0 /100 WBCS — SIGNIFICANT CHANGE UP (ref 0–0)
PLATELET # BLD AUTO: 165 K/UL — SIGNIFICANT CHANGE UP (ref 150–400)
POTASSIUM SERPL-MCNC: 3.5 MMOL/L — SIGNIFICANT CHANGE UP (ref 3.5–5.3)
POTASSIUM SERPL-SCNC: 3.5 MMOL/L — SIGNIFICANT CHANGE UP (ref 3.5–5.3)
RBC # BLD: 3.43 M/UL — LOW (ref 4.2–5.8)
RBC # FLD: 13 % — SIGNIFICANT CHANGE UP (ref 10.3–14.5)
SODIUM SERPL-SCNC: 141 MMOL/L — SIGNIFICANT CHANGE UP (ref 135–145)
WBC # BLD: 5 K/UL — SIGNIFICANT CHANGE UP (ref 3.8–10.5)
WBC # FLD AUTO: 5 K/UL — SIGNIFICANT CHANGE UP (ref 3.8–10.5)

## 2025-04-21 PROCEDURE — 88311 DECALCIFY TISSUE: CPT | Mod: 26

## 2025-04-21 PROCEDURE — 99222 1ST HOSP IP/OBS MODERATE 55: CPT

## 2025-04-21 PROCEDURE — 27447 TOTAL KNEE ARTHROPLASTY: CPT | Mod: AS,RT

## 2025-04-21 PROCEDURE — 88305 TISSUE EXAM BY PATHOLOGIST: CPT | Mod: 26

## 2025-04-21 PROCEDURE — 73560 X-RAY EXAM OF KNEE 1 OR 2: CPT | Mod: 26,RT

## 2025-04-21 DEVICE — BASEPLATE TIB UNIV TRIATHLON SZ 4: Type: IMPLANTABLE DEVICE | Site: RIGHT | Status: FUNCTIONAL

## 2025-04-21 DEVICE — COMP PATELLA ASYMMETRIC X3 32X10MM: Type: IMPLANTABLE DEVICE | Site: RIGHT | Status: FUNCTIONAL

## 2025-04-21 DEVICE — STRYKER PIN 1/8 X 3.5" LONG: Type: IMPLANTABLE DEVICE | Site: RIGHT | Status: FUNCTIONAL

## 2025-04-21 DEVICE — INSERT TIB BEARING CS X3 SZ 4 9MM: Type: IMPLANTABLE DEVICE | Site: RIGHT | Status: FUNCTIONAL

## 2025-04-21 DEVICE — CEMENT SIMPLEX WITH TOBRAMYCIN: Type: IMPLANTABLE DEVICE | Site: RIGHT | Status: FUNCTIONAL

## 2025-04-21 DEVICE — COMP FEM TRIATHLON CR SZ 4 RT: Type: IMPLANTABLE DEVICE | Site: RIGHT | Status: FUNCTIONAL

## 2025-04-21 RX ORDER — ATORVASTATIN CALCIUM 80 MG/1
20 TABLET, FILM COATED ORAL AT BEDTIME
Refills: 0 | Status: DISCONTINUED | OUTPATIENT
Start: 2025-04-21 | End: 2025-04-22

## 2025-04-21 RX ORDER — ASPIRIN 325 MG
1 TABLET ORAL
Qty: 42 | Refills: 0
Start: 2025-04-21 | End: 2025-06-01

## 2025-04-21 RX ORDER — CYST/ALA/Q10/PHOS.SER/DHA/BROC 100-20-50
1 POWDER (GRAM) ORAL DAILY
Refills: 0 | Status: DISCONTINUED | OUTPATIENT
Start: 2025-04-21 | End: 2025-04-22

## 2025-04-21 RX ORDER — POLYETHYLENE GLYCOL 3350 17 G/17G
17 POWDER, FOR SOLUTION ORAL
Qty: 1 | Refills: 0
Start: 2025-04-21 | End: 2025-05-02

## 2025-04-21 RX ORDER — ONDANSETRON HCL/PF 4 MG/2 ML
4 VIAL (ML) INJECTION ONCE
Refills: 0 | Status: DISCONTINUED | OUTPATIENT
Start: 2025-04-21 | End: 2025-04-21

## 2025-04-21 RX ORDER — TRAMADOL HYDROCHLORIDE 50 MG/1
50 TABLET, FILM COATED ORAL EVERY 8 HOURS
Refills: 0 | Status: DISCONTINUED | OUTPATIENT
Start: 2025-04-21 | End: 2025-04-22

## 2025-04-21 RX ORDER — ATORVASTATIN CALCIUM 80 MG/1
20 TABLET, FILM COATED ORAL AT BEDTIME
Refills: 0 | Status: DISCONTINUED | OUTPATIENT
Start: 2025-04-21 | End: 2025-04-21

## 2025-04-21 RX ORDER — ATORVASTATIN CALCIUM 80 MG/1
1 TABLET, FILM COATED ORAL
Refills: 0 | DISCHARGE

## 2025-04-21 RX ORDER — SENNA 187 MG
2 TABLET ORAL AT BEDTIME
Refills: 0 | Status: DISCONTINUED | OUTPATIENT
Start: 2025-04-21 | End: 2025-04-22

## 2025-04-21 RX ORDER — TAMSULOSIN HYDROCHLORIDE 0.4 MG/1
1 CAPSULE ORAL
Refills: 0 | DISCHARGE

## 2025-04-21 RX ORDER — METFORMIN HYDROCHLORIDE 850 MG/1
1 TABLET ORAL
Refills: 0 | DISCHARGE

## 2025-04-21 RX ORDER — FENTANYL CITRATE-0.9 % NACL/PF 100MCG/2ML
25 SYRINGE (ML) INTRAVENOUS
Refills: 0 | Status: DISCONTINUED | OUTPATIENT
Start: 2025-04-21 | End: 2025-04-21

## 2025-04-21 RX ORDER — MAGNESIUM HYDROXIDE 400 MG/5ML
30 SUSPENSION ORAL DAILY
Refills: 0 | Status: DISCONTINUED | OUTPATIENT
Start: 2025-04-21 | End: 2025-04-22

## 2025-04-21 RX ORDER — DONEPEZIL HYDROCHLORIDE 5 MG/1
1 TABLET ORAL
Qty: 0 | Refills: 0 | DISCHARGE

## 2025-04-21 RX ORDER — CALCIUM CARBONATE 750 MG/1
1 TABLET ORAL
Refills: 0 | DISCHARGE

## 2025-04-21 RX ORDER — SODIUM CHLORIDE 9 G/1000ML
1000 INJECTION, SOLUTION INTRAVENOUS
Refills: 0 | Status: DISCONTINUED | OUTPATIENT
Start: 2025-04-21 | End: 2025-04-22

## 2025-04-21 RX ORDER — ACETAMINOPHEN 500 MG/5ML
1000 LIQUID (ML) ORAL EVERY 8 HOURS
Refills: 0 | Status: COMPLETED | OUTPATIENT
Start: 2025-04-21 | End: 2025-04-22

## 2025-04-21 RX ORDER — TAMSULOSIN HYDROCHLORIDE 0.4 MG/1
0.4 CAPSULE ORAL AT BEDTIME
Refills: 0 | Status: DISCONTINUED | OUTPATIENT
Start: 2025-04-21 | End: 2025-04-21

## 2025-04-21 RX ORDER — IBUPROFEN 200 MG
1 TABLET ORAL
Qty: 42 | Refills: 0
Start: 2025-04-21 | End: 2025-05-04

## 2025-04-21 RX ORDER — METFORMIN HYDROCHLORIDE 850 MG/1
500 TABLET ORAL DAILY
Refills: 0 | Status: DISCONTINUED | OUTPATIENT
Start: 2025-04-21 | End: 2025-04-21

## 2025-04-21 RX ORDER — CYCLOBENZAPRINE HYDROCHLORIDE 15 MG/1
1 CAPSULE, EXTENDED RELEASE ORAL
Qty: 9 | Refills: 0
Start: 2025-04-21 | End: 2025-04-23

## 2025-04-21 RX ORDER — DEXTROSE 50 % IN WATER 50 %
25 SYRINGE (ML) INTRAVENOUS ONCE
Refills: 0 | Status: DISCONTINUED | OUTPATIENT
Start: 2025-04-21 | End: 2025-04-22

## 2025-04-21 RX ORDER — TAMSULOSIN HYDROCHLORIDE 0.4 MG/1
0.4 CAPSULE ORAL AT BEDTIME
Refills: 0 | Status: DISCONTINUED | OUTPATIENT
Start: 2025-04-21 | End: 2025-04-22

## 2025-04-21 RX ORDER — CEFAZOLIN SODIUM IN 0.9 % NACL 3 G/100 ML
2000 INTRAVENOUS SOLUTION, PIGGYBACK (ML) INTRAVENOUS EVERY 8 HOURS
Refills: 0 | Status: COMPLETED | OUTPATIENT
Start: 2025-04-22 | End: 2025-04-22

## 2025-04-21 RX ORDER — CELECOXIB 50 MG/1
200 CAPSULE ORAL DAILY
Refills: 0 | Status: DISCONTINUED | OUTPATIENT
Start: 2025-04-22 | End: 2025-04-22

## 2025-04-21 RX ORDER — CYST/ALA/Q10/PHOS.SER/DHA/BROC 100-20-50
1 POWDER (GRAM) ORAL
Refills: 0 | DISCHARGE

## 2025-04-21 RX ORDER — OXYCODONE HYDROCHLORIDE 30 MG/1
5 TABLET ORAL EVERY 6 HOURS
Refills: 0 | Status: DISCONTINUED | OUTPATIENT
Start: 2025-04-21 | End: 2025-04-22

## 2025-04-21 RX ORDER — LOSARTAN POTASSIUM 100 MG/1
25 TABLET, FILM COATED ORAL DAILY
Refills: 0 | Status: DISCONTINUED | OUTPATIENT
Start: 2025-04-21 | End: 2025-04-21

## 2025-04-21 RX ORDER — INSULIN LISPRO 100 U/ML
INJECTION, SOLUTION INTRAVENOUS; SUBCUTANEOUS
Refills: 0 | Status: DISCONTINUED | OUTPATIENT
Start: 2025-04-21 | End: 2025-04-22

## 2025-04-21 RX ORDER — SENNA 187 MG
1 TABLET ORAL
Qty: 14 | Refills: 0
Start: 2025-04-21 | End: 2025-04-27

## 2025-04-21 RX ORDER — DEXTROSE 50 % IN WATER 50 %
12.5 SYRINGE (ML) INTRAVENOUS ONCE
Refills: 0 | Status: DISCONTINUED | OUTPATIENT
Start: 2025-04-21 | End: 2025-04-22

## 2025-04-21 RX ORDER — ENOXAPARIN SODIUM 100 MG/ML
30 INJECTION SUBCUTANEOUS EVERY 12 HOURS
Refills: 0 | Status: DISCONTINUED | OUTPATIENT
Start: 2025-04-21 | End: 2025-04-22

## 2025-04-21 RX ORDER — DONEPEZIL HYDROCHLORIDE 5 MG/1
5 TABLET ORAL AT BEDTIME
Refills: 0 | Status: DISCONTINUED | OUTPATIENT
Start: 2025-04-21 | End: 2025-04-22

## 2025-04-21 RX ORDER — FENTANYL CITRATE-0.9 % NACL/PF 100MCG/2ML
50 SYRINGE (ML) INTRAVENOUS
Refills: 0 | Status: DISCONTINUED | OUTPATIENT
Start: 2025-04-21 | End: 2025-04-21

## 2025-04-21 RX ORDER — OXYCODONE HYDROCHLORIDE AND ACETAMINOPHEN 10; 325 MG/1; MG/1
1 TABLET ORAL
Qty: 28 | Refills: 0
Start: 2025-04-21 | End: 2025-04-27

## 2025-04-21 RX ORDER — GLUCAGON 3 MG/1
1 POWDER NASAL ONCE
Refills: 0 | Status: DISCONTINUED | OUTPATIENT
Start: 2025-04-21 | End: 2025-04-22

## 2025-04-21 RX ORDER — LOSARTAN POTASSIUM 100 MG/1
25 TABLET, FILM COATED ORAL DAILY
Refills: 0 | Status: DISCONTINUED | OUTPATIENT
Start: 2025-04-21 | End: 2025-04-22

## 2025-04-21 RX ORDER — ONDANSETRON HCL/PF 4 MG/2 ML
4 VIAL (ML) INJECTION EVERY 6 HOURS
Refills: 0 | Status: DISCONTINUED | OUTPATIENT
Start: 2025-04-21 | End: 2025-04-22

## 2025-04-21 RX ORDER — DONEPEZIL HYDROCHLORIDE 5 MG/1
5 TABLET ORAL AT BEDTIME
Refills: 0 | Status: DISCONTINUED | OUTPATIENT
Start: 2025-04-21 | End: 2025-04-21

## 2025-04-21 RX ORDER — DEXTROSE 50 % IN WATER 50 %
15 SYRINGE (ML) INTRAVENOUS ONCE
Refills: 0 | Status: DISCONTINUED | OUTPATIENT
Start: 2025-04-21 | End: 2025-04-22

## 2025-04-21 RX ORDER — KETOROLAC TROMETHAMINE 30 MG/ML
15 INJECTION, SOLUTION INTRAMUSCULAR; INTRAVENOUS EVERY 6 HOURS
Refills: 0 | Status: DISCONTINUED | OUTPATIENT
Start: 2025-04-21 | End: 2025-04-22

## 2025-04-21 RX ORDER — LOSARTAN POTASSIUM 100 MG/1
1 TABLET, FILM COATED ORAL
Refills: 0 | DISCHARGE

## 2025-04-21 RX ORDER — POLYETHYLENE GLYCOL 3350 17 G/17G
17 POWDER, FOR SOLUTION ORAL AT BEDTIME
Refills: 0 | Status: DISCONTINUED | OUTPATIENT
Start: 2025-04-21 | End: 2025-04-22

## 2025-04-21 RX ADMIN — Medication 1000 UNIT(S): at 17:24

## 2025-04-21 RX ADMIN — TAMSULOSIN HYDROCHLORIDE 0.4 MILLIGRAM(S): 0.4 CAPSULE ORAL at 21:23

## 2025-04-21 RX ADMIN — Medication 1000 MILLIGRAM(S): at 22:24

## 2025-04-21 RX ADMIN — Medication 2 TABLET(S): at 21:23

## 2025-04-21 RX ADMIN — KETOROLAC TROMETHAMINE 15 MILLIGRAM(S): 30 INJECTION, SOLUTION INTRAMUSCULAR; INTRAVENOUS at 15:11

## 2025-04-21 RX ADMIN — TRAMADOL HYDROCHLORIDE 50 MILLIGRAM(S): 50 TABLET, FILM COATED ORAL at 22:23

## 2025-04-21 RX ADMIN — TRAMADOL HYDROCHLORIDE 50 MILLIGRAM(S): 50 TABLET, FILM COATED ORAL at 21:23

## 2025-04-21 RX ADMIN — Medication 1 APPLICATION(S): at 08:15

## 2025-04-21 RX ADMIN — POLYETHYLENE GLYCOL 3350 17 GRAM(S): 17 POWDER, FOR SOLUTION ORAL at 21:24

## 2025-04-21 RX ADMIN — ATORVASTATIN CALCIUM 20 MILLIGRAM(S): 80 TABLET, FILM COATED ORAL at 21:24

## 2025-04-21 RX ADMIN — Medication 1000 MILLIGRAM(S): at 21:24

## 2025-04-21 RX ADMIN — ENOXAPARIN SODIUM 30 MILLIGRAM(S): 100 INJECTION SUBCUTANEOUS at 17:24

## 2025-04-21 RX ADMIN — Medication 975 MILLIGRAM(S): at 08:12

## 2025-04-21 RX ADMIN — TRAMADOL HYDROCHLORIDE 50 MILLIGRAM(S): 50 TABLET, FILM COATED ORAL at 08:15

## 2025-04-21 RX ADMIN — KETOROLAC TROMETHAMINE 15 MILLIGRAM(S): 30 INJECTION, SOLUTION INTRAMUSCULAR; INTRAVENOUS at 16:30

## 2025-04-21 RX ADMIN — DONEPEZIL HYDROCHLORIDE 5 MILLIGRAM(S): 5 TABLET ORAL at 21:23

## 2025-04-21 RX ADMIN — CELECOXIB 200 MILLIGRAM(S): 50 CAPSULE ORAL at 08:12

## 2025-04-21 RX ADMIN — Medication 1 TABLET(S): at 17:24

## 2025-04-21 NOTE — PATIENT PROFILE ADULT - INTERPRETER'S NAME
Problem: Chronic Conditions and Co-morbidities  Goal: Patient's chronic conditions and co-morbidity symptoms are monitored and maintained or improved  Outcome: Progressing     Problem: Discharge Planning  Goal: Discharge to home or other facility with appropriate resources  Outcome: Progressing     Problem: Pain  Goal: Verbalizes/displays adequate comfort level or baseline comfort level  Outcome: Progressing     Problem: Safety - Adult  Goal: Free from fall injury  Outcome: Progressing     Problem: ABCDS Injury Assessment  Goal: Absence of physical injury  Outcome: Progressing KELLEN

## 2025-04-21 NOTE — DISCHARGE NOTE PROVIDER - CARE PROVIDER_API CALL
Liz Arrington  Orthopaedic Surgery  [address] [address] [place] [address], [address]  [place] [address]  Phone: [phone]  Fax: [phone]  Follow Up Time: 2 weeks

## 2025-04-21 NOTE — DISCHARGE NOTE PROVIDER - HOSPITAL COURSE
Pt is a  80 y/o M who underwent elective  Right tight total knee replacement on 4/21/25 . No complications. Pt received daily Physical therapy and Deep vein thrombosis prophylaxis postoperatively. Stable for discharge home.

## 2025-04-21 NOTE — PROGRESS NOTE ADULT - SUBJECTIVE AND OBJECTIVE BOX
Orthopedic Surgery     79yMale    Diagnosis:  S/p RIGHT Total Knee Replacement POD#0    24hr interval:  Patient was seen and evaluated at bedside. Patient with no acute complaints. Translated provided by Dr. Liz Arrington   Pain is mild; localized to the RLE and well controlled medications.      Denies CP/SOB, dyspnea, paresthesias, N/V/D, palpitations.     Vital Signs Last 24 Hrs  T(C): 36.8 (21 Apr 2025 13:59), Max: 36.8 (21 Apr 2025 13:59)  T(F): 98.2 (21 Apr 2025 13:59), Max: 98.2 (21 Apr 2025 13:59)  HR: 68 (21 Apr 2025 13:59) (62 - 70)  BP: 144/77 (21 Apr 2025 13:59) (101/58 - 144/77)  BP(mean): 87 (21 Apr 2025 13:39) (76 - 88)  RR: 17 (21 Apr 2025 13:59) (13 - 20)  SpO2: 96% (21 Apr 2025 13:59) (95% - 98%)    Parameters below as of 21 Apr 2025 13:59  Patient On (Oxygen Delivery Method): room air      I&O's Detail      Physical Exam:    General: AAOx3, NAD, resting comfortably in bed.  MSK:  Right knee:  Dressing is C/D/I.   Skin is pink and warm.    No erythema.   No wound drainage.   Lower extremities:  No calf tenderness, calves are soft.   2+pulses. NVI. 5/5 Strength of EHL/TA/gastrocnemius B/L.    Good capillary refill. SILT.                          11.2   5.00  )-----------( 165      ( 21 Apr 2025 12:07 )             31.8     04-21    141  |  112[H]  |  15  ----------------------------<  113[H]  3.5   |  22  |  1.02    Ca    8.3[L]      21 Apr 2025 12:07        Impression:  79yMale S/p Right Total Knee Replacement POD#0  Plan:  -  Pain management  -  Dvt prophylaxis with Lovenox  -  Daily Physical Therapy:  WBAT of the right lower extremity with appropriate assistive device  -  Discharge planning: Home pending   -  Continue with Post-op Antibiotics x 24hrs  -  Encouraged use of incentive spirometry  -  Case d/w Dr. Hill An    Orthopedic Surgery     79yMale    Diagnosis:  S/p RIGHT Total Knee Replacement POD#0    24hr interval:  Patient was seen and evaluated at bedside. Patient with no acute complaints. Translated provided by Dr. Liz Arrington   Pain is mild; localized to the RLE and well controlled medications.    Denies CP/SOB, dyspnea, paresthesias, N/V/D, palpitations.     Vital Signs Last 24 Hrs  T(C): 36.8 (21 Apr 2025 13:59), Max: 36.8 (21 Apr 2025 13:59)  T(F): 98.2 (21 Apr 2025 13:59), Max: 98.2 (21 Apr 2025 13:59)  HR: 68 (21 Apr 2025 13:59) (62 - 70)  BP: 144/77 (21 Apr 2025 13:59) (101/58 - 144/77)  BP(mean): 87 (21 Apr 2025 13:39) (76 - 88)  RR: 17 (21 Apr 2025 13:59) (13 - 20)  SpO2: 96% (21 Apr 2025 13:59) (95% - 98%)    Parameters below as of 21 Apr 2025 13:59  Patient On (Oxygen Delivery Method): room air      I&O's Detail      Physical Exam:    General: AAOx3, NAD, resting comfortably in bed.  MSK:  Right knee:  Aquacel dressing is C/D/I.   Skin is pink and warm.    No erythema.   No wound drainage.   Lower extremities:  No calf tenderness, calves are soft.   2+pulses. NVI. 5/5 Strength of EHL/TA/gastrocnemius B/L.    Good capillary refill. SILT.                          11.2   5.00  )-----------( 165      ( 21 Apr 2025 12:07 )             31.8     04-21    141  |  112[H]  |  15  ----------------------------<  113[H]  3.5   |  22  |  1.02    Ca    8.3[L]      21 Apr 2025 12:07        Impression:  79yMale S/p Right Total Knee Replacement POD#0  Plan:  -  Pain management  -  Dvt prophylaxis with Lovenox  -  Daily Physical Therapy:  WBAT of the right lower extremity with appropriate assistive device  -  Discharge planning: Home pending   -  Continue with Post-op Antibiotics x 24hrs  -  Encouraged use of incentive spirometry  -  Case d/w Dr. Hill An

## 2025-04-21 NOTE — ASU PATIENT PROFILE, ADULT - TOBACCO USE
[facility] Cardiology     Subjective:    Patient ID:  Qing Poole is a 64 y.o. female who presents for evaluation of Chest Pain, Hypertension, and Shortness of Breath    Review of patient's allergies indicates:  No Known Allergies   The patient is here for chest pain evaluation.  There is no cardiac history.  She takes losartan for hypertension 25 mg daily.  Her current LDL is 123 mg%.  She has a history of dementia.  She is here with her .  He was concerned about her cardiac status because he had indigestion symptoms associated with NSTEMI.  He got concerned when she had her symptoms.  She was started on Protonix.  Her symptoms are improving.    She goes to the gym to work out a couple of days a week.  She rides a stationary bike.  She does not have chest discomfort when she rides the bicycle.  She did report some shortness of breath on exertion.  She has never had a stress test.    Today's electrocardiogram reviewed and independently interpreted by myself confirms sinus rhythm heart rate 73.  No ischemic ST changes noted.    Chest Pain   This is a new problem. The problem occurs intermittently. Associated symptoms include shortness of breath. Pertinent negatives include no abdominal pain, back pain, claudication, cough, diaphoresis, dizziness, fever, headaches, hemoptysis, irregular heartbeat, malaise/fatigue, nausea, near-syncope, numbness, orthopnea, palpitations, PND, sputum production, syncope, vomiting or weakness.   Pertinent negatives for past medical history include no muscle weakness and no seizures.   Hypertension  Associated symptoms include chest pain and shortness of breath. Pertinent negatives include no blurred vision, headaches, malaise/fatigue, neck pain, orthopnea, palpitations or PND.   Shortness of Breath  Associated symptoms include chest pain. Pertinent negatives include no abdominal pain, claudication, ear  pain, fever, headaches, hemoptysis, leg swelling, neck pain, orthopnea, PND, rash, sore throat, sputum production, syncope, vomiting or wheezing.       Review of Systems   Constitutional: Negative for chills, decreased appetite, diaphoresis, fever, malaise/fatigue, night sweats, weight gain and weight loss.   HENT:  Negative for congestion, ear discharge, ear pain, hearing loss, hoarse voice, nosebleeds, odynophagia, sore throat, stridor and tinnitus.    Eyes:  Negative for blurred vision, discharge, double vision, pain, photophobia, redness, vision loss in left eye, vision loss in right eye, visual disturbance and visual halos.   Cardiovascular:  Positive for chest pain. Negative for claudication, cyanosis, dyspnea on exertion, irregular heartbeat, leg swelling, near-syncope, orthopnea, palpitations, paroxysmal nocturnal dyspnea and syncope.   Respiratory:  Positive for shortness of breath. Negative for cough, hemoptysis, sleep disturbances due to breathing, snoring, sputum production and wheezing.    Endocrine: Negative for cold intolerance, heat intolerance, polydipsia, polyphagia and polyuria.   Hematologic/Lymphatic: Negative for adenopathy and bleeding problem. Does not bruise/bleed easily.   Skin:  Negative for color change, dry skin, flushing, itching, nail changes, poor wound healing, rash, skin cancer, suspicious lesions and unusual hair distribution.   Musculoskeletal:  Positive for joint pain. Negative for arthritis, back pain, falls, gout, joint swelling, muscle cramps, muscle weakness, myalgias, neck pain and stiffness.   Gastrointestinal:  Positive for heartburn. Negative for bloating, abdominal pain, anorexia, change in bowel habit, bowel incontinence, constipation, diarrhea, dysphagia, excessive appetite, flatus, hematemesis, hematochezia, hemorrhoids, jaundice, melena, nausea and vomiting.   Genitourinary:  Negative for bladder incontinence, decreased libido, dysuria, flank pain, frequency, genital  "sores, hematuria, hesitancy, incomplete emptying, nocturia and urgency.   Neurological:  Negative for aphonia, brief paralysis, difficulty with concentration, disturbances in coordination, excessive daytime sleepiness, dizziness, focal weakness, headaches, light-headedness, loss of balance, numbness, paresthesias, seizures, sensory change, tremors, vertigo and weakness.   Psychiatric/Behavioral:  Positive for memory loss. Negative for altered mental status, depression, hallucinations, substance abuse, suicidal ideas and thoughts of violence. The patient does not have insomnia and is not nervous/anxious.    Allergic/Immunologic: Negative for hives and persistent infections.        Objective:       Vitals:    07/29/24 1313   BP: 126/84   Pulse: 73   Resp: 18   SpO2: 99%   Weight: 112.8 kg (248 lb 10.9 oz)   Height: 5' 9" (1.753 m)    Physical Exam  Constitutional:       General: She is not in acute distress.     Appearance: She is well-developed. She is not diaphoretic.   HENT:      Head: Normocephalic and atraumatic.      Nose: Nose normal.   Eyes:      General: No scleral icterus.        Right eye: No discharge.      Conjunctiva/sclera: Conjunctivae normal.      Pupils: Pupils are equal, round, and reactive to light.   Neck:      Thyroid: No thyromegaly.      Vascular: No JVD.      Trachea: No tracheal deviation.   Cardiovascular:      Rate and Rhythm: Normal rate and regular rhythm.      Pulses:           Carotid pulses are 2+ on the right side and 2+ on the left side.       Radial pulses are 2+ on the right side and 2+ on the left side.        Dorsalis pedis pulses are 2+ on the right side and 2+ on the left side.        Posterior tibial pulses are 2+ on the right side and 2+ on the left side.      Heart sounds: Normal heart sounds. No murmur heard.     No friction rub. No gallop.   Pulmonary:      Effort: Pulmonary effort is normal. No respiratory distress.      Breath sounds: Normal breath sounds. No stridor. No " wheezing or rales.   Chest:      Chest wall: No tenderness.   Abdominal:      General: Bowel sounds are normal. There is no distension.      Palpations: Abdomen is soft. There is no mass.      Tenderness: There is no abdominal tenderness. There is no guarding or rebound.   Musculoskeletal:         General: No tenderness. Normal range of motion.      Cervical back: Normal range of motion and neck supple.   Lymphadenopathy:      Cervical: No cervical adenopathy.   Skin:     General: Skin is warm and dry.      Coloration: Skin is not pale.      Findings: No erythema or rash.   Neurological:      Mental Status: She is alert and oriented to person, place, and time.      Cranial Nerves: No cranial nerve deficit.      Coordination: Coordination normal.   Psychiatric:         Behavior: Behavior normal.         Thought Content: Thought content normal.         Judgment: Judgment normal.           Assessment:       1. Chest pain, unspecified type    2. Chest pain of uncertain etiology    3. Benign essential HTN    4. Obesity (BMI 30-39.9)    5. Mild dementia without behavioral disturbance, psychotic disturbance, mood disturbance, or anxiety, unspecified dementia type      Results for orders placed or performed in visit on 07/26/24   Vitamin B12   Result Value Ref Range    Vitamin B-12 >2000 (H) 210 - 950 pg/mL   Microalbumin/Creatinine Ratio, Urine   Result Value Ref Range    Microalbumin, Urine <5.0 ug/mL    Creatinine, Urine 101.0 15.0 - 325.0 mg/dL    Microalb/Creat Ratio Unable to calculate 0.0 - 30.0 ug/mg   Comprehensive Metabolic Panel   Result Value Ref Range    Sodium 140 136 - 145 mmol/L    Potassium 4.7 3.5 - 5.1 mmol/L    Chloride 107 95 - 110 mmol/L    CO2 25 23 - 29 mmol/L    Glucose 82 70 - 110 mg/dL    BUN 19 8 - 23 mg/dL    Creatinine 0.9 0.5 - 1.4 mg/dL    Calcium 9.4 8.7 - 10.5 mg/dL    Total Protein 7.3 6.0 - 8.4 g/dL    Albumin 3.8 3.5 - 5.2 g/dL    Total Bilirubin 0.6 0.1 - 1.0 mg/dL    Alkaline  Phosphatase 79 55 - 135 U/L    AST 20 10 - 40 U/L    ALT 16 10 - 44 U/L    eGFR >60.0 >60 mL/min/1.73 m^2    Anion Gap 8 8 - 16 mmol/L   CBC Auto Differential   Result Value Ref Range    WBC 8.00 3.90 - 12.70 K/uL    RBC 4.53 4.00 - 5.40 M/uL    Hemoglobin 13.7 12.0 - 16.0 g/dL    Hematocrit 42.6 37.0 - 48.5 %    MCV 94 82 - 98 fL    MCH 30.2 27.0 - 31.0 pg    MCHC 32.2 32.0 - 36.0 g/dL    RDW 14.5 11.5 - 14.5 %    Platelets 341 150 - 450 K/uL    MPV 10.9 9.2 - 12.9 fL    Immature Granulocytes 0.8 (H) 0.0 - 0.5 %    Gran # (ANC) 5.2 1.8 - 7.7 K/uL    Immature Grans (Abs) 0.06 (H) 0.00 - 0.04 K/uL    Lymph # 1.9 1.0 - 4.8 K/uL    Mono # 0.6 0.3 - 1.0 K/uL    Eos # 0.2 0.0 - 0.5 K/uL    Baso # 0.06 0.00 - 0.20 K/uL    nRBC 0 0 /100 WBC    Gran % 64.4 38.0 - 73.0 %    Lymph % 23.6 18.0 - 48.0 %    Mono % 7.9 4.0 - 15.0 %    Eosinophil % 2.5 0.0 - 8.0 %    Basophil % 0.8 0.0 - 1.9 %    Differential Method Automated    Lipid Panel   Result Value Ref Range    Cholesterol 191 120 - 199 mg/dL    Triglycerides 95 30 - 150 mg/dL    HDL 51 40 - 75 mg/dL    LDL Cholesterol 121.0 63.0 - 159.0 mg/dL    HDL/Cholesterol Ratio 26.7 20.0 - 50.0 %    Total Cholesterol/HDL Ratio 3.7 2.0 - 5.0    Non-HDL Cholesterol 140 mg/dL   TSH   Result Value Ref Range    TSH 2.835 0.400 - 4.000 uIU/mL         Current Outpatient Medications:     b complex vitamins tablet, Take 1 tablet by mouth every morning., Disp: , Rfl:     biotin 1 mg tablet, take one tablet by mouth daily, Disp: 28 tablet, Rfl: 99    cyanocobalamin (VITAMIN B-12) 1000 MCG tablet, take one tablet by mouth daily, Disp: 28 tablet, Rfl: 99    donepeziL (ARICEPT) 10 MG tablet, Take 1 tablet (10 mg total) by mouth once daily., Disp: 30 tablet, Rfl: 5    EScitalopram oxalate (LEXAPRO) 10 MG tablet, Take 1 tablet (10 mg total) by mouth once daily., Disp: 90 tablet, Rfl: 3    ibuprofen (ADVIL,MOTRIN) 800 MG tablet, Take 1 tablet (800 mg total) by mouth 3 (three) times daily., Disp: 90  "tablet, Rfl: 5    losartan (COZAAR) 25 MG tablet, Take 1 tablet (25 mg total) by mouth once daily., Disp: 90 tablet, Rfl: 3    magnesium oxide (MAG-OX) 250 mg magnesium Tab, take one tablet by mouth daily, Disp: 28 tablet, Rfl: 99    memantine (NAMENDA) 10 MG Tab, Take 1 tablet (10 mg total) by mouth 2 (two) times daily., Disp: 180 tablet, Rfl: 3    pantoprazole (PROTONIX) 40 MG tablet, Take 1 tablet (40 mg total) by mouth once daily., Disp: 30 tablet, Rfl: 2    mupirocin (BACTROBAN) 2 % ointment, APPLY TO AFFECTED AREAS ON FACE TWICE DAILY (Patient not taking: Reported on 7/29/2024), Disp: , Rfl:      Lab Results   Component Value Date    WBC 8.00 07/26/2024    RBC 4.53 07/26/2024    HGB 13.7 07/26/2024    HCT 42.6 07/26/2024    MCV 94 07/26/2024    MCH 30.2 07/26/2024    MCHC 32.2 07/26/2024    RDW 14.5 07/26/2024     07/26/2024    MPV 10.9 07/26/2024    GRAN 5.2 07/26/2024    GRAN 64.4 07/26/2024    LYMPH 1.9 07/26/2024    LYMPH 23.6 07/26/2024    MONO 0.6 07/26/2024    MONO 7.9 07/26/2024    EOS 0.2 07/26/2024    BASO 0.06 07/26/2024    EOSINOPHIL 2.5 07/26/2024    BASOPHIL 0.8 07/26/2024        CMP  Lab Results   Component Value Date     07/26/2024    K 4.7 07/26/2024     07/26/2024    CO2 25 07/26/2024    GLU 82 07/26/2024    BUN 19 07/26/2024    CREATININE 0.9 07/26/2024    CALCIUM 9.4 07/26/2024    PROT 7.3 07/26/2024    ALBUMIN 3.8 07/26/2024    BILITOT 0.6 07/26/2024    ALKPHOS 79 07/26/2024    AST 20 07/26/2024    ALT 16 07/26/2024    ANIONGAP 8 07/26/2024    ESTGFRAFRICA >60.0 07/12/2022    EGFRNONAA >60.0 07/12/2022        No results found for: "LABBLOO", "LABURIN", "RESPIRATORYC", "GSRESP"         No results found for this or any previous visit.     DXA Bone Density Appendicular Skeleton 03/12/2024 [id number] Final   Mammo Digital Screening Bilat w/ Jose Alfredo 09/05/2023 [id number] Final   CT Maxillofacial Without Contrast 10/20/2022 [id number] Final            Plan:       Problem List Items " Addressed This Visit          Neuro    Mild dementia without behavioral disturbance, psychotic disturbance, mood disturbance, or anxiety, unspecified dementia type     Condition unchanged.            Cardiac/Vascular    Benign essential HTN     Losartan will be continued.  Condition controlled.         Chest pain of uncertain etiology     Stress test ordered.  She has poor exercise tolerance as well as dementia diagnosis.  Pharmacologic stress echo ordered today.            Endocrine    Obesity (BMI 30-39.9)     Weight loss encouraged.          Other Visit Diagnoses       Chest pain, unspecified type    -  Primary    Relevant Orders    IN OFFICE EKG 12-LEAD (to Muse)    Stress Echo Which stress agent will be used? Pharmacological; Color Flow Doppler? No               My assessment is most likely esophageal/gastric etiology for chest pain.  Protonix to continue.  Dobutamine stress echo ordered for completeness sake.  Reassurance provided to the patient.    Provided her stress test is negative I would not advise follow-up.  Her LDL is 120 range.  No statin therapy advised.    Thank you for allowing me to participate in your patient's care.              Harvinder Savage MD  07/29/2024   1:45 PM           Never smoker

## 2025-04-21 NOTE — PHYSICAL THERAPY INITIAL EVALUATION ADULT - DIAGNOSIS, PT EVAL
Patient presents with pain during ambulation(8/10), impaired AROM/muscle strength on R knee, slight impairments in balance during ambulation

## 2025-04-21 NOTE — PATIENT PROFILE ADULT - NSFALLSECTIONLABEL_GEN_A_CORE
. Ketoconazole Pregnancy And Lactation Text: This medication is Pregnancy Category C and it isn't know if it is safe during pregnancy. It is also excreted in breast milk and breast feeding isn't recommended.

## 2025-04-21 NOTE — DISCHARGE NOTE PROVIDER - NSDCCPTREATMENT_GEN_ALL_CORE_FT
PRINCIPAL PROCEDURE  Procedure: Right total knee replacement  Findings and Treatment: If patient has drainage from the wound, please contact the surgeon's office.   If patient has intractable pain, please contact the surgeon's office.   If excessively warm at the incision site is noted, please contact the surgeon's office.   If redness is noted, especially spreading, please contact the surgeon's office.   If patient has fever over 101F please return to the hospital through the Emergency Room.   If concepción blood is saturating the dressing, please return to the hospital through the Emergency Room.  If drainage of fluid or pus is noted, please return to the hospital through the Emergency Room.

## 2025-04-21 NOTE — CONSULT NOTE ADULT - SUBJECTIVE AND OBJECTIVE BOX
Source of information: [facility], Chart review  Patient language: Mandarin  : Gabriela # [id number]    HPI:    Patient is a 79y old  Male with PMH OA HTN HLD BPH who presents for a scheduled Right total knee Replacement on 4/21, now POD#0. Pain consulted for acute postop knee pain. Pt seen and examined at bedside. Pt sitting in chair, reports right knee pain score 7/10 SCALE USED: (1-10 VNRS). Pt describes pain as aching, non- radiating, alleviated by pain medication, exacerbated by movement. Reports mild right leg numbness. Pt tolerating PO diet. Denies lethargy, chest pain, SOB, nausea, vomiting, constipation. Patient stated goal for pain control: to be able to take deep breaths, get out of bed to chair and ambulate with tolerable pain control. Pt ambulated with PT with increased pain. Encouraged to take PRN pain meds. Pt denies taking medications for pain at home.     PAST MEDICAL & SURGICAL HISTORY:  Vitamin D deficiency      Hypertension      Hyperlipidemia      Enlarged prostate      History of constipation      No significant past surgical history          FAMILY HISTORY:      Social History:   [X ] Denies ETOH use, illicit drug use and smoking    Allergies    No Known Allergies    MEDICATIONS  (STANDING):  acetaminophen     Tablet .. 1000 milliGRAM(s) Oral every 8 hours  atorvastatin 20 milliGRAM(s) Oral at bedtime  cholecalciferol 1000 Unit(s) Oral daily  dextrose 5%. 1000 milliLiter(s) (50 mL/Hr) IV Continuous <Continuous>  dextrose 5%. 1000 milliLiter(s) (100 mL/Hr) IV Continuous <Continuous>  dextrose 50% Injectable 25 Gram(s) IV Push once  dextrose 50% Injectable 12.5 Gram(s) IV Push once  dextrose 50% Injectable 25 Gram(s) IV Push once  donepezil 5 milliGRAM(s) Oral at bedtime  enoxaparin Injectable 30 milliGRAM(s) SubCutaneous every 12 hours  glucagon  Injectable 1 milliGRAM(s) IntraMuscular once  insulin lispro (ADMELOG) corrective regimen sliding scale   SubCutaneous three times a day before meals  losartan 25 milliGRAM(s) Oral daily  multivitamin/minerals 1 Tablet(s) Oral daily  polyethylene glycol 3350 17 Gram(s) Oral at bedtime  senna 2 Tablet(s) Oral at bedtime  sodium chloride 0.9%. 1000 milliLiter(s) (110 mL/Hr) IV Continuous <Continuous>  tamsulosin 0.4 milliGRAM(s) Oral at bedtime  traMADol 50 milliGRAM(s) Oral every 8 hours    MEDICATIONS  (PRN):  dextrose Oral Gel 15 Gram(s) Oral once PRN Blood Glucose LESS THAN 70 milliGRAM(s)/deciliter  fentaNYL    Injectable 50 MICROGram(s) IV Push every 15 minutes PRN Severe Pain (7 - 10)  ketorolac   Injectable 15 milliGRAM(s) IV Push every 6 hours PRN Severe Pain (7 - 10)  magnesium hydroxide Suspension 30 milliLiter(s) Oral daily PRN Constipation  ondansetron Injectable 4 milliGRAM(s) IV Push every 6 hours PRN Nausea and/or Vomiting  oxyCODONE    IR 5 milliGRAM(s) Oral every 6 hours PRN Moderate Pain (4 - 6)      Vital Signs Last 24 Hrs  T(C): 36.8 (21 Apr 2025 13:59), Max: 36.8 (21 Apr 2025 13:59)  T(F): 98.2 (21 Apr 2025 13:59), Max: 98.2 (21 Apr 2025 13:59)  HR: 68 (21 Apr 2025 13:59) (62 - 70)  BP: 144/77 (21 Apr 2025 13:59) (101/58 - 144/77)  BP(mean): 87 (21 Apr 2025 13:39) (76 - 88)  RR: 17 (21 Apr 2025 13:59) (13 - 20)  SpO2: 96% (21 Apr 2025 13:59) (95% - 98%)    Parameters below as of 21 Apr 2025 13:59  Patient On (Oxygen Delivery Method): room air        LABS: Reviewed.                          11.2   5.00  )-----------( 165      ( 21 Apr 2025 12:07 )             31.8     04-21    141  |  112[H]  |  15  ----------------------------<  113[H]  3.5   |  22  |  1.02    Ca    8.3[L]      21 Apr 2025 12:07          Urinalysis Basic - ( 21 Apr 2025 12:07 )    Color: x / Appearance: x / SG: x / pH: x  Gluc: 113 mg/dL / Ketone: x  / Bili: x / Urobili: x   Blood: x / Protein: x / Nitrite: x   Leuk Esterase: x / RBC: x / WBC x   Sq Epi: x / Non Sq Epi: x / Bacteria: x      CAPILLARY BLOOD GLUCOSE      POCT Blood Glucose.: 113 mg/dL (21 Apr 2025 12:06)  POCT Blood Glucose.: 108 mg/dL (21 Apr 2025 07:43)        Radiology: None to review       ORT Score -   Family Hx of substance abuse	Female	      Male  Alcohol 	                                           1                     3  Illegal drugs	                                   2                     3  Rx drugs                                           4 	                  4  Personal Hx of substance abuse		  Alcohol 	                                          3	                  3  Illegal drugs                                     4	                  4  Rx drugs                                            5 	                  5  Age between 16- 45 years	           1                     1  hx preadolescent sexual abuse	   3 	                  0  Psychological disease		  ADD, OCD, bipolar, schizophrenia   2	          2  Depression                                           1 	          1  Total: 0    a score of 3 or lower indicates low risk for opioid abuse		  a score of 4-7 indicates moderate risk for opioid abuse		  a score of 8 or higher indicates high risk for opioid abuse  	  REVIEW OF SYSTEMS:  CONSTITUTIONAL: No fever or fatigue  HEENT:  No difficulty hearing, no change in vision  NECK: No pain or stiffness  RESPIRATORY: No cough, wheezing, chills or hemoptysis; No shortness of breath  CARDIOVASCULAR: No chest pain, palpitations, dizziness, or leg swelling  GASTROINTESTINAL: No loss of appetite, decreased PO intake. No abdominal or epigastric pain. No nausea, vomiting; No diarrhea or constipation.   GENITOURINARY: No dysuria, frequency, hematuria, retention or incontinence  MUSCULOSKELETAL: + right knee joint pain and swelling; no upper or lower motor strength weakness, no saddle anesthesia, bowel/bladder incontinence, no falls   NEURO: No headaches, + right leg numbness, No weakness    PHYSICAL EXAM:  GENERAL:  Alert & Oriented X4, cooperative, NAD, Good concentration. Speech is clear.   RESPIRATORY: Respirations even and unlabored. Clear to auscultation bilaterally; No rales, rhonchi, wheezing, or rubs  CARDIOVASCULAR: Normal S1/S2, regular rate and rhythm; No murmurs, rubs, or gallops. No JVD.   GASTROINTESTINAL:  Soft, Nontender, Nondistended; Bowel sounds present  PERIPHERAL VASCULAR:  Extremities warm with right knee edema. 2+ Peripheral Pulses, No cyanosis, No calf tenderness  MUSCULOSKELETAL: Motor Strength 5/5 B/L upper and 5/5 left lower extremity, 4/5 right lower extremities; + decreased right knee ROM intact; + right knee tenderness on palpation  SKIN: Warm, dry, intact. No rashes, lesions, scars or wounds + right knee ace wrap c/d/i     Risk factors associated with adverse outcomes related to opioid treatment  [ ]  Concurrent benzodiazepine use  [ ]  History/ Active substance use or alcohol use disorder  [ ] Psychiatric co-morbidity  [ ] Sleep apnea  [ ] COPD  [ ] BMI> 35  [ ] Liver dysfunction  [ ] Renal dysfunction  [ ] CHF  [ ] Smoker  [X ]  Age > 60 years    [X ]  [place]  Reviewed and Copied to Chart. See below.    Plan of care and goal oriented pain management treatment options were discussed with patient and /or primary care giver; all questions and concerns were addressed and care was aligned with patient's wishes.    Educated patient on goal oriented pain management treatment options

## 2025-04-21 NOTE — PATIENT PROFILE ADULT - NS PRO AD NO ADVANCE DIRECTIVE
Chief Complaint : Referral - Dr. Jack    Hx/Sx: MIBC    Records/Orders: Available    Pt Contacted: N/a    At Rooming: Xiao Mcconnell, EMT      
No

## 2025-04-21 NOTE — DISCHARGE NOTE PROVIDER - NSDCCPCAREPLAN_GEN_ALL_CORE_FT
PRINCIPAL DISCHARGE DIAGNOSIS  Diagnosis: Osteoarthritis of right knee  Assessment and Plan of Treatment: Pain Management- See Attached Medication Reconciliation  StretchStrap Stretching exercises for Total knee replacement***  Weight Bearing Status: WBAT to RLE with walker  Equipment needs: Maxi, Walker  Dressing: Please keep bandage/dressing Clean, Dry, and Intact. LEAVE AQUACELL DRESSING ON UNTIL FOLLOW UP VISIT or no longer clean and intact/saturated dresing.   if aquacel removed, change with waterproof dressing every 4-5 days or with regular dressing (4x4, abd, ACE. mepilex, etc) every 2-3 days.    Incision Site: Absorbable sutures were placed, will dissolve on their own  Dvt prophylaxis: D/C Aspirin 81mg in 6 weeks on 6/3/25  PT/Occupational Therapy are Activities of Daily Living as appropriate  Follow up with Orthopedic Surgeon Dr. Liz Arrington

## 2025-04-21 NOTE — PATIENT PROFILE ADULT - FLU SEASON?
MEDICATIONS:  · See Medication List (bring to your doctor appointments).      VACCINES:  Most Recent Immunizations   Administered Date(s) Administered   • Influenza, injectable, quadrivalent, preservative-free 11/11/2017   • Influenza, seasonal, injectable, trivalent 11/05/2014       ACTIVITY:  · Weigh yourself daily (first thing in the morning, with same amount of clothes on) unless told otherwise by your doctor.  · Continue activity as you were in the hospital, slowly increase to what you were doing previously.    SMOKING:  · Avoid all tobacco products and secondhand smoke.  · Smoking Cessation Counseling offered.  · [place] Toll Free Quit Line: [phone]    DIET:  · Limit salt and salty foods unless told otherwise by your doctor.    · Trouble breathing or chest pain - CALL 911    CONTACT YOUR DOCTOR IF:  · You have symptoms that are not \"normal\" for you.  · You have new or worse symptoms or pain, not relieved by medicine or rest.  · Temperature greater than 101 degrees F, chills or flu like symptoms.  · You gain more than 3 pounds in 2 days.        
Yes...

## 2025-04-21 NOTE — DISCHARGE NOTE PROVIDER - NSDCMRMEDTOKEN_GEN_ALL_CORE_FT
atorvastatin 20 mg oral tablet: 1 tab(s) orally once a day (at bedtime)  donepezil 5 mg oral tablet: 1 tab(s) orally  losartan 25 mg oral tablet: 1 tab(s) orally once a day  metFORMIN 500 mg oral tablet: 1 tab(s) orally once a day  Oyster Jefferson 500 mg oral tablet: 1 tablet with sensor orally once a day  Prosight oral tablet: 1 tab(s) orally once a day  tamsulosin 0.4 mg oral capsule: 1 cap(s) orally once a day  Vitamin D3 25 mcg (1000 intl units) oral capsule: 1 cap(s) orally once a day   aspirin 81 mg oral tablet, chewable: 1 tab(s) chewed once a day MDD: 1  atorvastatin 20 mg oral tablet: 1 tab(s) orally once a day (at bedtime)  cyclobenzaprine 10 mg oral tablet: 1 tab(s) orally every 8 hours as needed for  muscle spasm MDD: 3  donepezil 5 mg oral tablet: 1 tab(s) orally once a day take as prescribed by your PCP  ibuprofen 600 mg oral tablet: 1 tab(s) orally every 8 hours as needed for  moderate pain MDD: 3  losartan 25 mg oral tablet: 1 tab(s) orally once a day  metFORMIN 500 mg oral tablet: 1 tab(s) orally once a day  MiraLax oral powder for reconstitution: 17 gram(s) orally once a day (at bedtime) as needed for  constipation MDD: 1  Oyster Jefferson 500 mg oral tablet: 1 tablet with sensor orally once a day  Percocet 7.5 mg-325 mg oral tablet: 1 tab(s) orally every 6 hours as needed for  severe pain MDD: 4  Prosight oral tablet: 1 tab(s) orally once a day  Senna 8.6 mg oral tablet: 1 tab(s) orally every 12 hours as needed for  constipation MDD: 2  tamsulosin 0.4 mg oral capsule: 1 cap(s) orally once a day  Vitamin D3 25 mcg (1000 intl units) oral capsule: 1 cap(s) orally once a day   aspirin 81 mg oral tablet, chewable: 1 tab(s) chewed once a day MDD: 1  atorvastatin 20 mg oral tablet: 1 tab(s) orally once a day (at bedtime)  cyclobenzaprine 10 mg oral tablet: 1 tab(s) orally every 8 hours as needed for  muscle spasm MDD: 3  donepezil 5 mg oral tablet: 1 tab(s) orally once a day take as prescribed by your PCP  ibuprofen 600 mg oral tablet: 1 tab(s) orally every 8 hours as needed for  moderate pain MDD: 3  losartan 25 mg oral tablet: 1 tab(s) orally once a day  metFORMIN 500 mg oral tablet: 1 tab(s) orally once a day  MiraLax oral powder for reconstitution: 17 gram(s) orally once a day (at bedtime) as needed for  constipation MDD: 1  Oyster Jefferson 500 mg oral tablet: 1 tablet with sensor orally once a day  Prosight oral tablet: 1 tab(s) orally once a day  Senna 8.6 mg oral tablet: 1 tab(s) orally every 12 hours as needed for  constipation MDD: 2  tamsulosin 0.4 mg oral capsule: 1 cap(s) orally once a day  traMADol 50 mg oral tablet: 1 tab(s) orally every 8 hours as needed for  severe pain MDD: 2  Vitamin D3 25 mcg (1000 intl units) oral capsule: 1 cap(s) orally once a day   aspirin 81 mg oral tablet, chewable: 1 tab(s) chewed every 12 hours MDD: 2  atorvastatin 20 mg oral tablet: 1 tab(s) orally once a day (at bedtime)  cyclobenzaprine 10 mg oral tablet: 1 tab(s) orally every 8 hours as needed for  muscle spasm MDD: 3  cyclobenzaprine 10 mg oral tablet: 1 tab(s) orally every 8 hours as needed for  muscle spasm MDD: 3  donepezil 5 mg oral tablet: 1 tab(s) orally once a day take as prescribed by your PCP  ibuprofen 600 mg oral tablet: 1 tab(s) orally every 8 hours as needed for  moderate pain MDD: 3  losartan 25 mg oral tablet: 1 tab(s) orally once a day  metFORMIN 500 mg oral tablet: 1 tab(s) orally once a day  MiraLax oral powder for reconstitution: 17 gram(s) orally once a day (at bedtime) as needed for  constipation MDD: 1  Oyster Jefferson 500 mg oral tablet: 1 tablet with sensor orally once a day  Percocet 5 mg-325 mg oral tablet: 1 tab(s) orally every 6 hours as needed for  severe pain MDD: 4  Prosight oral tablet: 1 tab(s) orally once a day  Senna 8.6 mg oral tablet: 1 tab(s) orally every 12 hours as needed for  constipation MDD: 2  tamsulosin 0.4 mg oral capsule: 1 cap(s) orally once a day  Vitamin D3 25 mcg (1000 intl units) oral capsule: 1 cap(s) orally once a day

## 2025-04-21 NOTE — PROVIDER CONTACT NOTE (CHANGE IN STATUS NOTIFICATION) - ASSESSMENT
seen and examined by Dr. Smart at 1230 and made aware patient have sensation to b/l feet but no movement yet; will continue to monitor patient longer in PACU; spoke to Dr. Smart  at 1316 patient have sensation to b/l feet, no dorsiplexion, plantarfexion, unable to bend knees but able to move b/l feet side to side; will discharge patient in a few

## 2025-04-21 NOTE — CONSULT NOTE ADULT - ASSESSMENT
Confidential Drug Utilization Report  Search Terms: kartik sanchez, 1945Search Date: 04/21/2025 16:06:22 PM  The Drug Utilization Report below displays all of the controlled substance prescriptions, if any, that your patient has filled in the last twelve months. The information displayed on this report is compiled from pharmacy submissions to the Department, and accurately reflects the information as submitted by the pharmacies.    This report was requested by: Rachelle Lamas | Reference #: [id number]    There are no results for the search terms that you entered.

## 2025-04-21 NOTE — CONSULT NOTE ADULT - PROBLEM SELECTOR RECOMMENDATION 9
Pt with acute right knee postoperative pain which is somatic in nature due to right TKR on 4/21 POD#0.   High risk medications reviewed. Avoid polypharmacy. Avoid IV opioids. Avoid NSAIDs and benzodiazepines. Non-pharmacological sleep aides initiated. Non-opioid medications and non-pharmacological pain management measures initiated.   Opioid pain recommendations   - Continue Tramadol 50mg PO q 8 hours. Monitor for sedation/ respiratory depression.   - Continue Oxycodone 5 mg PO q 4 hours PRN moderate pain. Monitor for sedation/ respiratory depression.   Non-opioid pain recommendations   - Continue Toradol 15mg IVP q 6 hours PRN severe pain  - Continue Acetaminophen 1 gram PO q 6 hours for 24 hours only. Monitor LFTs  - Continue Celebrex 200mg PO daily  Bowel Regimen  - Continue Miralax 17G PO daily  - Continue Senna 2 tablets at bedtime for constipation  Mild pain   - Non-pharmacological pain treatment recommendations  - Warm/ Cool packs PRN   - Repositioning extremity, elevation, imagery, relaxation, distraction.  - Physical therapy OOB if no contraindications   Recommendations discussed with primary team and RN.  Upon discharge – dc on Celebrex 200mg po daily and Percocet 5/325mg po q 6 hours prn for 1 week. Pt to take OTC stool softeners

## 2025-04-21 NOTE — ASU PATIENT PROFILE, ADULT - FALL HARM RISK - UNIVERSAL INTERVENTIONS
Bed in lowest position, wheels locked, appropriate side rails in place/Call bell, personal items and telephone in reach/Instruct patient to call for assistance before getting out of bed or chair/Non-slip footwear when patient is out of bed/[place] to call system/Physically safe environment - no spills, clutter or unnecessary equipment/Purposeful Proactive Rounding/Room/bathroom lighting operational, light cord in reach

## 2025-04-21 NOTE — PATIENT PROFILE ADULT - FALL HARM RISK - HARM RISK INTERVENTIONS
Assistance with ambulation/Assistance OOB with selected safe patient handling equipment/Communicate Risk of Fall with Harm to all staff/Discuss with provider need for PT consult/Monitor gait and stability/Provide patient with walking aids - walker, cane, crutches/Reinforce activity limits and safety measures with patient and family/Sit up slowly, dangle for a short time, stand at bedside before walking/Tailored Fall Risk Interventions/Use of alarms - bed, chair and/or voice tab/Visual Cue: Yellow wristband and red socks/Bed in lowest position, wheels locked, appropriate side rails in place/Call bell, personal items and telephone in reach/Instruct patient to call for assistance before getting out of bed or chair/Non-slip footwear when patient is out of bed/[place] to call system/Physically safe environment - no spills, clutter or unnecessary equipment/Purposeful Proactive Rounding/Room/bathroom lighting operational, light cord in reach

## 2025-04-22 ENCOUNTER — TRANSCRIPTION ENCOUNTER (OUTPATIENT)
Age: 80
End: 2025-04-22

## 2025-04-22 VITALS
DIASTOLIC BLOOD PRESSURE: 65 MMHG | RESPIRATION RATE: 16 BRPM | SYSTOLIC BLOOD PRESSURE: 116 MMHG | OXYGEN SATURATION: 93 % | HEART RATE: 64 BPM | TEMPERATURE: 98 F

## 2025-04-22 LAB
A1C WITH ESTIMATED AVERAGE GLUCOSE RESULT: 6.4 % — HIGH (ref 4–5.6)
ANION GAP SERPL CALC-SCNC: 6 MMOL/L — SIGNIFICANT CHANGE UP (ref 5–17)
BUN SERPL-MCNC: 25 MG/DL — HIGH (ref 7–18)
CALCIUM SERPL-MCNC: 8.8 MG/DL — SIGNIFICANT CHANGE UP (ref 8.4–10.5)
CHLORIDE SERPL-SCNC: 112 MMOL/L — HIGH (ref 96–108)
CO2 SERPL-SCNC: 21 MMOL/L — LOW (ref 22–31)
CREAT SERPL-MCNC: 1.14 MG/DL — SIGNIFICANT CHANGE UP (ref 0.5–1.3)
EGFR: 65 ML/MIN/1.73M2 — SIGNIFICANT CHANGE UP
EGFR: 65 ML/MIN/1.73M2 — SIGNIFICANT CHANGE UP
ESTIMATED AVERAGE GLUCOSE: 137 MG/DL — HIGH (ref 68–114)
GLUCOSE BLDC GLUCOMTR-MCNC: 137 MG/DL — HIGH (ref 70–99)
GLUCOSE BLDC GLUCOMTR-MCNC: 188 MG/DL — HIGH (ref 70–99)
GLUCOSE SERPL-MCNC: 147 MG/DL — HIGH (ref 70–99)
HCT VFR BLD CALC: 27.4 % — LOW (ref 39–50)
HGB BLD-MCNC: 9.4 G/DL — LOW (ref 13–17)
MCHC RBC-ENTMCNC: 32.1 PG — SIGNIFICANT CHANGE UP (ref 27–34)
MCHC RBC-ENTMCNC: 34.3 G/DL — SIGNIFICANT CHANGE UP (ref 32–36)
MCV RBC AUTO: 93.5 FL — SIGNIFICANT CHANGE UP (ref 80–100)
NRBC BLD AUTO-RTO: 0 /100 WBCS — SIGNIFICANT CHANGE UP (ref 0–0)
PLATELET # BLD AUTO: 161 K/UL — SIGNIFICANT CHANGE UP (ref 150–400)
POTASSIUM SERPL-MCNC: 3.9 MMOL/L — SIGNIFICANT CHANGE UP (ref 3.5–5.3)
POTASSIUM SERPL-SCNC: 3.9 MMOL/L — SIGNIFICANT CHANGE UP (ref 3.5–5.3)
RBC # BLD: 2.93 M/UL — LOW (ref 4.2–5.8)
RBC # FLD: 13 % — SIGNIFICANT CHANGE UP (ref 10.3–14.5)
SODIUM SERPL-SCNC: 139 MMOL/L — SIGNIFICANT CHANGE UP (ref 135–145)
WBC # BLD: 11.43 K/UL — HIGH (ref 3.8–10.5)
WBC # FLD AUTO: 11.43 K/UL — HIGH (ref 3.8–10.5)

## 2025-04-22 PROCEDURE — 97530 THERAPEUTIC ACTIVITIES: CPT

## 2025-04-22 PROCEDURE — 99232 SBSQ HOSP IP/OBS MODERATE 35: CPT

## 2025-04-22 PROCEDURE — C1776: CPT

## 2025-04-22 PROCEDURE — 88305 TISSUE EXAM BY PATHOLOGIST: CPT

## 2025-04-22 PROCEDURE — 85027 COMPLETE CBC AUTOMATED: CPT

## 2025-04-22 PROCEDURE — 97110 THERAPEUTIC EXERCISES: CPT

## 2025-04-22 PROCEDURE — 36415 COLL VENOUS BLD VENIPUNCTURE: CPT

## 2025-04-22 PROCEDURE — 82962 GLUCOSE BLOOD TEST: CPT

## 2025-04-22 PROCEDURE — 73560 X-RAY EXAM OF KNEE 1 OR 2: CPT

## 2025-04-22 PROCEDURE — 97116 GAIT TRAINING THERAPY: CPT

## 2025-04-22 PROCEDURE — 88311 DECALCIFY TISSUE: CPT

## 2025-04-22 PROCEDURE — 83036 HEMOGLOBIN GLYCOSYLATED A1C: CPT

## 2025-04-22 PROCEDURE — 86850 RBC ANTIBODY SCREEN: CPT

## 2025-04-22 PROCEDURE — 86901 BLOOD TYPING SEROLOGIC RH(D): CPT

## 2025-04-22 PROCEDURE — 86900 BLOOD TYPING SEROLOGIC ABO: CPT

## 2025-04-22 PROCEDURE — C1713: CPT

## 2025-04-22 PROCEDURE — 80048 BASIC METABOLIC PNL TOTAL CA: CPT

## 2025-04-22 RX ORDER — ACETAMINOPHEN 500 MG/5ML
1000 LIQUID (ML) ORAL EVERY 8 HOURS
Refills: 0 | Status: DISCONTINUED | OUTPATIENT
Start: 2025-04-22 | End: 2025-04-22

## 2025-04-22 RX ORDER — OXYCODONE HYDROCHLORIDE 30 MG/1
5 TABLET ORAL EVERY 6 HOURS
Refills: 0 | Status: DISCONTINUED | OUTPATIENT
Start: 2025-04-22 | End: 2025-04-22

## 2025-04-22 RX ORDER — ASPIRIN 325 MG
1 TABLET ORAL
Qty: 56 | Refills: 0
Start: 2025-04-22 | End: 2025-05-19

## 2025-04-22 RX ORDER — CYCLOBENZAPRINE HYDROCHLORIDE 15 MG/1
1 CAPSULE, EXTENDED RELEASE ORAL
Qty: 9 | Refills: 0
Start: 2025-04-22 | End: 2025-04-24

## 2025-04-22 RX ORDER — IBUPROFEN 200 MG
1 TABLET ORAL
Qty: 42 | Refills: 0
Start: 2025-04-22 | End: 2025-05-05

## 2025-04-22 RX ORDER — HYDROMORPHONE/SOD CHLOR,ISO/PF 2 MG/10 ML
0.5 SYRINGE (ML) INJECTION ONCE
Refills: 0 | Status: DISCONTINUED | OUTPATIENT
Start: 2025-04-22 | End: 2025-04-22

## 2025-04-22 RX ORDER — SENNA 187 MG
1 TABLET ORAL
Qty: 14 | Refills: 0
Start: 2025-04-22 | End: 2025-04-28

## 2025-04-22 RX ORDER — TRAMADOL HYDROCHLORIDE 50 MG/1
1 TABLET, FILM COATED ORAL
Qty: 15 | Refills: 0
Start: 2025-04-22 | End: 2025-04-26

## 2025-04-22 RX ORDER — POLYETHYLENE GLYCOL 3350 17 G/17G
17 POWDER, FOR SOLUTION ORAL
Qty: 1 | Refills: 0
Start: 2025-04-22 | End: 2025-05-03

## 2025-04-22 RX ORDER — OXYCODONE HYDROCHLORIDE 30 MG/1
2.5 TABLET ORAL EVERY 4 HOURS
Refills: 0 | Status: DISCONTINUED | OUTPATIENT
Start: 2025-04-22 | End: 2025-04-22

## 2025-04-22 RX ORDER — OXYCODONE HYDROCHLORIDE AND ACETAMINOPHEN 10; 325 MG/1; MG/1
1 TABLET ORAL
Qty: 28 | Refills: 0
Start: 2025-04-22 | End: 2025-04-28

## 2025-04-22 RX ORDER — OXYCODONE HYDROCHLORIDE 30 MG/1
5 TABLET ORAL EVERY 4 HOURS
Refills: 0 | Status: DISCONTINUED | OUTPATIENT
Start: 2025-04-22 | End: 2025-04-22

## 2025-04-22 RX ORDER — ASPIRIN 325 MG
1 TABLET ORAL
Qty: 42 | Refills: 0
Start: 2025-04-22 | End: 2025-06-02

## 2025-04-22 RX ADMIN — Medication 1000 UNIT(S): at 14:13

## 2025-04-22 RX ADMIN — CELECOXIB 200 MILLIGRAM(S): 50 CAPSULE ORAL at 12:02

## 2025-04-22 RX ADMIN — INSULIN LISPRO 2: 100 INJECTION, SOLUTION INTRAVENOUS; SUBCUTANEOUS at 12:01

## 2025-04-22 RX ADMIN — Medication 100 MILLIGRAM(S): at 15:07

## 2025-04-22 RX ADMIN — KETOROLAC TROMETHAMINE 15 MILLIGRAM(S): 30 INJECTION, SOLUTION INTRAMUSCULAR; INTRAVENOUS at 09:15

## 2025-04-22 RX ADMIN — OXYCODONE HYDROCHLORIDE 5 MILLIGRAM(S): 30 TABLET ORAL at 10:39

## 2025-04-22 RX ADMIN — TRAMADOL HYDROCHLORIDE 50 MILLIGRAM(S): 50 TABLET, FILM COATED ORAL at 07:14

## 2025-04-22 RX ADMIN — Medication 100 MILLIGRAM(S): at 09:10

## 2025-04-22 RX ADMIN — Medication 1 TABLET(S): at 12:02

## 2025-04-22 RX ADMIN — Medication 1000 MILLIGRAM(S): at 07:15

## 2025-04-22 RX ADMIN — TRAMADOL HYDROCHLORIDE 50 MILLIGRAM(S): 50 TABLET, FILM COATED ORAL at 06:14

## 2025-04-22 RX ADMIN — Medication 0.5 MILLIGRAM(S): at 14:13

## 2025-04-22 RX ADMIN — LOSARTAN POTASSIUM 25 MILLIGRAM(S): 100 TABLET, FILM COATED ORAL at 06:16

## 2025-04-22 RX ADMIN — Medication 1000 MILLIGRAM(S): at 06:15

## 2025-04-22 RX ADMIN — ENOXAPARIN SODIUM 30 MILLIGRAM(S): 100 INJECTION SUBCUTANEOUS at 06:14

## 2025-04-22 NOTE — PROGRESS NOTE ADULT - SUBJECTIVE AND OBJECTIVE BOX
[id number]    Diagnosis: S/p Right Total Knee Replacement POD#1    Patient was seen and evaluated at bedside. Patient is Mandarin speaking,  used (ID#[id number])  Patient c/o R knee pain around the incision site. Patient states the pain is worse with movement and mild at rest.   Patient states he is awaiting further ambulation with PT. Patient states he does not remember walking with PT yesterday.  Pt denies Fever, Chest pain, SOB, dyspnea, paresthesias, N/V/D, abdominal pain, syncope, or pain anywhere else.     Vital Signs Last 24 Hrs  T(C): 36.7 (22 Apr 2025 05:06), Max: 36.8 (21 Apr 2025 13:59)  T(F): 98 (22 Apr 2025 05:06), Max: 98.2 (21 Apr 2025 13:59)  HR: 78 (22 Apr 2025 05:06) (66 - 98)  BP: 117/64 (22 Apr 2025 05:06) (105/63 - 144/77)  BP(mean): 92 (21 Apr 2025 20:14) (76 - 92)  RR: 17 (22 Apr 2025 05:06) (13 - 20)  SpO2: 92% (22 Apr 2025 05:06) (92% - 99%)    Parameters below as of 22 Apr 2025 05:06  Patient On (Oxygen Delivery Method): room air        04-21-25 @ 07:01  -  04-22-25 @ 07:00  --------------------------------------------------------  IN: 0 mL / OUT: 1900 mL / NET: -1900 mL        Physical Exam:  General: AAOx3, NAD, resting comfortably in bed.  Right knee:  Aquacel dressing has 1 spot over distal incision, otherwise C/D/I. Skin is pink and warm. SILT. Ecchymosis and edema over medial and posterior aspect of R knee. No obvious wound drainage noted.   Lower extremities:  No calf tenderness, calves are soft. 2+pulses. NVI. 5/5 Strength of EHL/FHL/ADF/APF.  Good capillary refill. SILT.                          9.4    11.43 )-----------( 161      ( 22 Apr 2025 05:20 )             27.4     04-22    139  |  112[H]  |  25[H]  ----------------------------<  147[H]  3.9   |  21[L]  |  1.14    Ca    8.8      22 Apr 2025 05:20        Impression:  79yMale S/p Right Total Knee Replacement POD#  Plan:  -  Pain management  -  DVT prophylaxis with Lovenox and venodynes  -  Daily Physical Therapy:  WBAT of the Right lower extremity with appropriate assistive device   -  Heel bump to RLE while lying in bed  -  Discharge planning: Home Vs. Rehab pending Physical therapy eval.  -  Continue with Post-op Antibiotics x 24hrs  -  Dressing change on POD#2  -  Incentive spirometry encouraged.   -  Case d/w   [id number]    Diagnosis: S/p Right Total Knee Replacement POD#1    Patient was seen and evaluated at bedside. Patient is Mandarin speaking,  used (ID#[id number])  Patient c/o R knee pain around the incision site. Patient states the pain is worse with movement and mild at rest.   Patient states he is awaiting further ambulation with PT. Patient states he does not remember walking with PT yesterday.  Pt denies Fever, Chest pain, SOB, dyspnea, paresthesias, N/V/D, abdominal pain, syncope, or pain anywhere else.     Vital Signs Last 24 Hrs  T(C): 36.7 (22 Apr 2025 05:06), Max: 36.8 (21 Apr 2025 13:59)  T(F): 98 (22 Apr 2025 05:06), Max: 98.2 (21 Apr 2025 13:59)  HR: 78 (22 Apr 2025 05:06) (66 - 98)  BP: 117/64 (22 Apr 2025 05:06) (105/63 - 144/77)  BP(mean): 92 (21 Apr 2025 20:14) (76 - 92)  RR: 17 (22 Apr 2025 05:06) (13 - 20)  SpO2: 92% (22 Apr 2025 05:06) (92% - 99%)    Parameters below as of 22 Apr 2025 05:06  Patient On (Oxygen Delivery Method): room air        04-21-25 @ 07:01  -  04-22-25 @ 07:00  --------------------------------------------------------  IN: 0 mL / OUT: 1900 mL / NET: -1900 mL        Physical Exam:  General: AAOx3, NAD, resting comfortably in bed.  Right knee:  Aquacel dressing has 1 spot over distal incision, otherwise C/D/I. Skin is pink and warm. SILT. Ecchymosis and edema over medial and posterior aspect of R knee. No obvious active wound drainage noted.   Lower extremities:  No calf tenderness, calves are soft. 2+pulses. NVI. 5/5 Strength of EHL/FHL/ADF/APF.  Good capillary refill. SILT.                          9.4    11.43 )-----------( 161      ( 22 Apr 2025 05:20 )             27.4     04-22    139  |  112[H]  |  25[H]  ----------------------------<  147[H]  3.9   |  21[L]  |  1.14    Ca    8.8      22 Apr 2025 05:20        Impression:  79yMale S/p Right Total Knee Replacement POD#1  Plan:  -  Pain management  -  DVT prophylaxis with Lovenox and venodynes  -  Daily Physical Therapy:  WBAT of the Right lower extremity with appropriate assistive device   -  Heel bump to RLE while lying in bed  -  Discharge planning: Home pending further PT evaluation  -  Continue with Post-op Antibiotics x 24hrs  -  Incentive spirometry encouraged.   -  Case d/w Dr. Arrington

## 2025-04-22 NOTE — PROGRESS NOTE ADULT - PROBLEM SELECTOR PLAN 1
Pt with acute right knee postoperative pain which is somatic in nature due to right TKR on 4/21 POD#1.   High risk medications reviewed. Avoid polypharmacy. Avoid IV opioids. Avoid NSAIDs and benzodiazepines. Non-pharmacological sleep aides maintained. Non-opioid medications and non-pharmacological pain management measures maintained.   Opioid pain recommendations   - Tramadol 50mg PO q 8 hours. Monitor for sedation/ respiratory depression.   - Oxycodone 2.5 mg Q 4 hrs PRN mild pain and 5 mg PO q 6 hours PRN severe pain. Monitor for sedation/ respiratory depression.   Non-opioid pain recommendations   - Acetaminophen 1 gram PO q 8 hours for 3 days. Monitor LFTs  - Continue Celebrex 200mg PO daily  Bowel Regimen  - Continue Miralax 17G PO daily  - Continue Senna 2 tablets at bedtime for constipation  Mild pain 1 - 3  - Non-pharmacological pain treatment recommendations  - Warm/ Cool packs PRN   - Repositioning, guided imagery, relaxation, distraction.  - Physical therapy OOB if no contraindications   Recommendations discussed with primary team and RN.  Upon discharge – dc on Celebrex 200mg po daily and Percocet 5/325mg po q 6 hours prn for 1 week. Pt to take OTC stool softeners.

## 2025-04-22 NOTE — DISCHARGE NOTE NURSING/CASE MANAGEMENT/SOCIAL WORK - PATIENT PORTAL LINK FT
You can access the FollowMyHealth Patient Portal offered by [facility] by registering at the following website: [email or web address]. By joining [employer]’s FollowMyHealth portal, you will also be able to view your health information using other applications (apps) compatible with our system.

## 2025-04-22 NOTE — DISCHARGE NOTE NURSING/CASE MANAGEMENT/SOCIAL WORK - NSSCCONTNUM_GEN_ALL_CORE
Take the PANTOPRAZOLE 40 mg twice per day for 8 weeks. If you are not getting better, then contact Dr. Thomas. You may also contact me if needed.     After 8 weeks, you can choose to stop the medication or lower to once a day; this all depends on your symptoms and talking to me either me or Dr. Thomas.   [phone]

## 2025-04-22 NOTE — PROGRESS NOTE ADULT - ASSESSMENT
Confidential Drug Utilization Report  Search Terms: kartik sanchez, 1945  Search Date: 04/21/2025 16:06:22 PM  The Drug Utilization Report below displays all of the controlled substance prescriptions, if any, that your patient has filled in the last twelve months. The information displayed on this report is compiled from pharmacy submissions to the Department, and accurately reflects the information as submitted by the pharmacies.    This report was requested by: Rachelle Lamas | Reference #: [id number]    There are no results for the search terms that you entered.

## 2025-04-22 NOTE — PROGRESS NOTE ADULT - SUBJECTIVE AND OBJECTIVE BOX
Source of information: [facility], Chart review  Patient language: Chinese   : Indira ID # [id number]    HPI:    This is a Patient is an 79y old  Male who presents with a chief complaint of s/p R TKA on 4/21/25 (22 Apr 2025 09:06)    Patient is an 79y old  Male with PMH OA HTN HLD BPH who presents for a scheduled Right total knee Replacement on 4/21, now POD#1. Pain service consulted for acute postop knee pain. Pt seen and examined at bedside with wife. Pt reports right knee pain score 9/10 and is intolerable SCALE USED: (1-10 VNRS). Pt describes pain as aching, non- radiating, alleviated by pain medication, exacerbated by movement. Pt tolerating PO diet. Denies lethargy, chest pain, SOB, nausea, vomiting, constipation. Discussed with unit RN to administer Dilaudid 0.5 mg IV x 1 and to monitor pt for sedation/respiratory depression. Patient stated goal for pain control: to be able to take deep breaths, get out of bed to chair and ambulate with tolerable pain control. Pt ambulated with PT with increased pain. Encouraged to take PRN pain meds. Pt denies taking medications for pain at home.     PAST MEDICAL & SURGICAL HISTORY:    Vitamin D deficiency    Hypertension    Hyperlipidemia    Enlarged prostate    History of constipation    No significant past surgical history    FAMILY HISTORY:    Social History:   [X]Denies ETOH use, illicit drug use, and smoking     Allergies  No Known Allergies    MEDICATIONS  (STANDING):  atorvastatin 20 milliGRAM(s) Oral at bedtime  celecoxib 200 milliGRAM(s) Oral daily  cholecalciferol 1000 Unit(s) Oral daily  dextrose 5%. 1000 milliLiter(s) (50 mL/Hr) IV Continuous <Continuous>  dextrose 5%. 1000 milliLiter(s) (100 mL/Hr) IV Continuous <Continuous>  dextrose 50% Injectable 25 Gram(s) IV Push once  dextrose 50% Injectable 12.5 Gram(s) IV Push once  dextrose 50% Injectable 25 Gram(s) IV Push once  donepezil 5 milliGRAM(s) Oral at bedtime  enoxaparin Injectable 30 milliGRAM(s) SubCutaneous every 12 hours  glucagon  Injectable 1 milliGRAM(s) IntraMuscular once  insulin lispro (ADMELOG) corrective regimen sliding scale   SubCutaneous three times a day before meals  losartan 25 milliGRAM(s) Oral daily  multivitamin/minerals 1 Tablet(s) Oral daily  polyethylene glycol 3350 17 Gram(s) Oral at bedtime  senna 2 Tablet(s) Oral at bedtime  sodium chloride 0.9%. 1000 milliLiter(s) (110 mL/Hr) IV Continuous <Continuous>  tamsulosin 0.4 milliGRAM(s) Oral at bedtime  traMADol 50 milliGRAM(s) Oral every 8 hours    MEDICATIONS  (PRN):  dextrose Oral Gel 15 Gram(s) Oral once PRN Blood Glucose LESS THAN 70 milliGRAM(s)/deciliter  magnesium hydroxide Suspension 30 milliLiter(s) Oral daily PRN Constipation  ondansetron Injectable 4 milliGRAM(s) IV Push every 6 hours PRN Nausea and/or Vomiting  oxyCODONE    IR 2.5 milliGRAM(s) Oral every 4 hours PRN Moderate Pain (4 - 6)  oxyCODONE    IR 5 milliGRAM(s) Oral every 6 hours PRN Severe Pain (7 - 10)    Vital Signs Last 24 Hrs  T(C): 36.7 (22 Apr 2025 05:06), Max: 36.7 (21 Apr 2025 23:40)  T(F): 98 (22 Apr 2025 05:06), Max: 98 (21 Apr 2025 23:40)  HR: 71 (22 Apr 2025 11:33) (71 - 98)  BP: 113/62 (22 Apr 2025 11:33) (105/63 - 126/75)  BP(mean): 79 (22 Apr 2025 11:33) (76 - 92)  RR: 17 (22 Apr 2025 05:06) (17 - 18)  SpO2: 95% (22 Apr 2025 11:33) (92% - 99%)    Parameters below as of 22 Apr 2025 11:33  Patient On (Oxygen Delivery Method): room air    LABS: Reviewed                          9.4    11.43 )-----------( 161      ( 22 Apr 2025 05:20 )             27.4     04-22    139  |  112[H]  |  25[H]  ----------------------------<  147[H]  3.9   |  21[L]  |  1.14    Ca    8.8      22 Apr 2025 05:20    Urinalysis Basic - ( 22 Apr 2025 05:20 )    Color: x / Appearance: x / SG: x / pH: x  Gluc: 147 mg/dL / Ketone: x  / Bili: x / Urobili: x   Blood: x / Protein: x / Nitrite: x   Leuk Esterase: x / RBC: x / WBC x   Sq Epi: x / Non Sq Epi: x / Bacteria: x    POCT Blood Glucose.: 188 mg/dL (22 Apr 2025 11:20)  POCT Blood Glucose.: 137 mg/dL (22 Apr 2025 07:37)  POCT Blood Glucose.: 198 mg/dL (21 Apr 2025 20:53)  POCT Blood Glucose.: 236 mg/dL (21 Apr 2025 16:29)    Radiology: Reviewed  < from: Xray Knee 1 or 2 Views, Right (04.21.25 @ 19:33) >    ACC: [id number] EXAM:  XR KNEE 1-2 VIEWS RT   ORDERED BY: ALYSSA CRUZ     PROCEDURE DATE:  04/21/2025      INTERPRETATION:  Right knee    HISTORY: Postop     2 views of the right knee show total knee replacement with resurfacing   of the patella.    IMPRESSION: Postoperative changes.    Thank you for this referral.    --- End of Report ---    ANALIA ACUÑA MD; Attending Interventional Radiologist  This document has been electronically signed. Apr 22 2025  2:50PM    < end of copied text >      ORT Score -   Family Hx of substance abuse	Female	      Male  Alcohol 	                                           1                     3  Illegal drugs	                                   2                     3  Rx drugs                                           4 	                  4  Personal Hx of substance abuse		  Alcohol 	                                          3	                  3  Illegal drugs                                     4	                  4  Rx drugs                                            5 	                  5  Age between 16- 45 years	           1                     1  hx preadolescent sexual abuse	   3 	                  0  Psychological disease		  ADD, OCD, bipolar, schizophrenia   2	          2  Depression                                           1 	          1  Total: 0    a score of 3 or lower indicates low risk for opioid abuse		  a score of 4-7 indicates moderate risk for opioid abuse		  a score of 8 or higher indicates high risk for opioid abuse  	  REVIEW OF SYSTEMS:  CONSTITUTIONAL: No fever or fatigue  HEENT:  No difficulty hearing, no change in vision  NECK: No pain or stiffness  RESPIRATORY: No cough, wheezing, chills or hemoptysis; No shortness of breath  CARDIOVASCULAR: No chest pain, palpitations, dizziness, or leg swelling  GASTROINTESTINAL: No loss of appetite, decreased PO intake. No abdominal or epigastric pain. No nausea, vomiting; No diarrhea or constipation.   GENITOURINARY: No dysuria, frequency, hematuria, retention or incontinence  MUSCULOSKELETAL: + right knee joint pain and swelling; no upper or lower motor strength weakness, no saddle anesthesia, bowel/bladder incontinence, no falls   NEURO: No headaches, + right leg numbness, No weakness    PHYSICAL EXAM:  GENERAL:  Alert & Oriented X4, cooperative, NAD, Good concentration. Speech is clear.   RESPIRATORY: Respirations even and unlabored. Clear to auscultation bilaterally; No rales, rhonchi, wheezing, or rubs  CARDIOVASCULAR: Normal S1/S2, regular rate and rhythm; No murmurs, rubs, or gallops. No JVD.   GASTROINTESTINAL:  Soft, Nontender, Nondistended; Bowel sounds present  PERIPHERAL VASCULAR:  Extremities warm with right knee edema. 2+ Peripheral Pulses, No cyanosis, No calf tenderness  MUSCULOSKELETAL: Motor Strength 5/5 B/L upper and 5/5 left lower extremity, 4/5 right lower extremities; + decreased right knee ROM intact; + right knee tenderness on palpation  SKIN: Warm, dry, intact. No rashes, lesions, scars or wounds + right knee ace wrap c/d/i     Risk factors associated with adverse outcomes related to opioid treatment  [ ]  Concurrent benzodiazepine use  [ ]  History/ Active substance use or alcohol use disorder  [ ] Psychiatric co-morbidity  [ ] Sleep apnea  [ ] COPD  [ ] BMI> 35  [ ] Liver dysfunction  [ ] Renal dysfunction  [ ] CHF  [ ] Smoker  [X ]  Age > 60 years    [X ]  [place]  Reviewed and Copied to Chart. See below.    Plan of care and goal oriented pain management treatment options were discussed with patient and /or primary care giver; all questions and concerns were addressed and care was aligned with patient's wishes.    Educated patient on goal oriented pain management treatment options     04-22-25 @ 15:11

## 2025-04-22 NOTE — DISCHARGE NOTE NURSING/CASE MANAGEMENT/SOCIAL WORK - FINANCIAL ASSISTANCE
[facility] provides services at a reduced cost to those who are determined to be eligible through [facility]’s financial assistance program. Information regarding [facility]’s financial assistance program can be found by going to [email or web address] or by calling [phone].

## 2025-05-07 LAB — SURGICAL PATHOLOGY STUDY: SIGNIFICANT CHANGE UP

## 2025-09-09 ENCOUNTER — APPOINTMENT (OUTPATIENT)
Dept: UROLOGY | Facility: CLINIC | Age: 80
End: 2025-09-09

## (undated) DEVICE — SOL INJ NS 0.9% 100ML

## (undated) DEVICE — FOLEY TRAY 16FR SURESTEP LTX BG

## (undated) DEVICE — HOOD FLYTE STRYKER HELMET SHIELD

## (undated) DEVICE — DRSG DERMABOND PRINEO 60CM

## (undated) DEVICE — GLV 8.5 PROTEXIS (BLUE)

## (undated) DEVICE — DRSG AQUACEL 3.5 X 10"

## (undated) DEVICE — FOR-ESU VALLEYLAB T7E15009DX: Type: DURABLE MEDICAL EQUIPMENT

## (undated) DEVICE — ELCTR AQUAMANTYS BIPOLAR SEALER 6.0

## (undated) DEVICE — SUT POLYSORB 2-0 30" GS-10 UNDYED

## (undated) DEVICE — WOUND IRR IRRISEPT W 0.5 CHG

## (undated) DEVICE — FOR-TOURNIQUET 27679911: Type: DURABLE MEDICAL EQUIPMENT

## (undated) DEVICE — DRSG AQUACEL 3.5 X 14"

## (undated) DEVICE — DRAPE IOBAN 33" X 23"

## (undated) DEVICE — SYR LUER LOK 20CC

## (undated) DEVICE — SUT QUILL PDO 2 36CM 48MM

## (undated) DEVICE — SOL IRR POUR H2O 1500ML

## (undated) DEVICE — Device

## (undated) DEVICE — STRYKER MIXEVAC 3 BONE CEMENT MIXER

## (undated) DEVICE — DRAPE TOWEL BLUE 17" X 24"

## (undated) DEVICE — SOL IRR BAG NS 0.9% 3000ML

## (undated) DEVICE — SUT QUILL MONODERM 3-0 30CM 26MM

## (undated) DEVICE — GLV 8 PROTEXIS (CREAM) MICRO

## (undated) DEVICE — NDL HYPO SAFE 22G X 1.5" (BLACK)

## (undated) DEVICE — MARKING PEN W RULER

## (undated) DEVICE — TOURNIQUET ESMARK 6"

## (undated) DEVICE — WARMING BLANKET UPPER ADULT

## (undated) DEVICE — MARKING PEN W RULER / LABELS

## (undated) DEVICE — TAPE SILK 3"

## (undated) DEVICE — TOURNIQUET CUFF 34" DUAL PORT W PLC

## (undated) DEVICE — GOWN XXL

## (undated) DEVICE — POSITIONER STIRRUP STRAP W SLIP RING 19X3.5"

## (undated) DEVICE — SOL INJ NS 0.9% 500ML 1-PORT

## (undated) DEVICE — DRAPE LIGHT HANDLE COVER (BLUE)

## (undated) DEVICE — ELCTR GROUNDING PAD ADULT COVIDIEN

## (undated) DEVICE — SAW BLADE STRYKER SAGITTAL DUAL CUT 18MMX90MMX1.27MM

## (undated) DEVICE — VENODYNE/SCD SLEEVE CALF MEDIUM

## (undated) DEVICE — SUT POLYSORB 1-0 18" HOS-12 UNDYED (POP-OFF)